# Patient Record
Sex: FEMALE | Race: BLACK OR AFRICAN AMERICAN | NOT HISPANIC OR LATINO | Employment: FULL TIME | URBAN - METROPOLITAN AREA
[De-identification: names, ages, dates, MRNs, and addresses within clinical notes are randomized per-mention and may not be internally consistent; named-entity substitution may affect disease eponyms.]

---
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
## 2022-04-09 ENCOUNTER — HISTORICAL (OUTPATIENT)
Dept: ADMINISTRATIVE | Facility: HOSPITAL | Age: 22
End: 2022-04-09

## 2022-04-27 VITALS
BODY MASS INDEX: 20.82 KG/M2 | DIASTOLIC BLOOD PRESSURE: 77 MMHG | OXYGEN SATURATION: 100 % | WEIGHT: 106.06 LBS | SYSTOLIC BLOOD PRESSURE: 120 MMHG | HEIGHT: 60 IN

## 2022-08-30 ENCOUNTER — HOSPITAL ENCOUNTER (EMERGENCY)
Facility: HOSPITAL | Age: 22
Discharge: HOME | End: 2022-08-30
Attending: FAMILY MEDICINE
Payer: MEDICAID

## 2022-08-30 VITALS
RESPIRATION RATE: 16 BRPM | SYSTOLIC BLOOD PRESSURE: 124 MMHG | DIASTOLIC BLOOD PRESSURE: 69 MMHG | BODY MASS INDEX: 20.35 KG/M2 | WEIGHT: 103.63 LBS | OXYGEN SATURATION: 97 % | TEMPERATURE: 98 F | HEIGHT: 60 IN | HEART RATE: 77 BPM

## 2022-08-30 DIAGNOSIS — N93.8 DYSFUNCTIONAL UTERINE BLEEDING: Primary | ICD-10-CM

## 2022-08-30 LAB
ALBUMIN SERPL-MCNC: 4 GM/DL (ref 3.5–5)
ALBUMIN/GLOB SERPL: 1 RATIO (ref 1.1–2)
ALP SERPL-CCNC: 50 UNIT/L (ref 40–150)
ALT SERPL-CCNC: 10 UNIT/L (ref 0–55)
APPEARANCE UR: ABNORMAL
AST SERPL-CCNC: 19 UNIT/L (ref 5–34)
B-HCG UR QL: NEGATIVE
BACTERIA #/AREA URNS AUTO: ABNORMAL /HPF
BASOPHILS # BLD AUTO: 0.02 X10(3)/MCL (ref 0–0.2)
BASOPHILS NFR BLD AUTO: 0.4 %
BILIRUB UR QL STRIP.AUTO: NEGATIVE MG/DL
BILIRUBIN DIRECT+TOT PNL SERPL-MCNC: 0.5 MG/DL
BUN SERPL-MCNC: 11.8 MG/DL (ref 7–18.7)
CALCIUM SERPL-MCNC: 9.9 MG/DL (ref 8.4–10.2)
CHLORIDE SERPL-SCNC: 103 MMOL/L (ref 98–107)
CO2 SERPL-SCNC: 26 MMOL/L (ref 22–29)
COLOR UR AUTO: ABNORMAL
CREAT SERPL-MCNC: 0.78 MG/DL (ref 0.55–1.02)
CTP QC/QA: YES
EOSINOPHIL # BLD AUTO: 0.07 X10(3)/MCL (ref 0–0.9)
EOSINOPHIL NFR BLD AUTO: 1.4 %
ERYTHROCYTE [DISTWIDTH] IN BLOOD BY AUTOMATED COUNT: 13 % (ref 11.5–17)
GFR SERPLBLD CREATININE-BSD FMLA CKD-EPI: >60 MLS/MIN/1.73/M2
GLOBULIN SER-MCNC: 4 GM/DL (ref 2.4–3.5)
GLUCOSE SERPL-MCNC: 99 MG/DL (ref 74–100)
GLUCOSE UR QL STRIP.AUTO: NEGATIVE MG/DL
HCT VFR BLD AUTO: 33.7 % (ref 37–47)
HGB BLD-MCNC: 11.4 GM/DL (ref 12–16)
IMM GRANULOCYTES # BLD AUTO: 0.02 X10(3)/MCL (ref 0–0.04)
IMM GRANULOCYTES NFR BLD AUTO: 0.4 %
KETONES UR QL STRIP.AUTO: ABNORMAL MG/DL
LEUKOCYTE ESTERASE UR QL STRIP.AUTO: 75 UNIT/L
LYMPHOCYTES # BLD AUTO: 2.09 X10(3)/MCL (ref 0.6–4.6)
LYMPHOCYTES NFR BLD AUTO: 42.7 %
MCH RBC QN AUTO: 29.9 PG (ref 27–31)
MCHC RBC AUTO-ENTMCNC: 33.8 MG/DL (ref 33–36)
MCV RBC AUTO: 88.5 FL (ref 80–94)
MONOCYTES # BLD AUTO: 0.36 X10(3)/MCL (ref 0.1–1.3)
MONOCYTES NFR BLD AUTO: 7.3 %
NEUTROPHILS # BLD AUTO: 2.3 X10(3)/MCL (ref 2.1–9.2)
NEUTROPHILS NFR BLD AUTO: 47.8 %
NITRITE UR QL STRIP.AUTO: NEGATIVE
NRBC BLD AUTO-RTO: 0 %
PH UR STRIP.AUTO: 5.5 [PH]
PLATELET # BLD AUTO: 318 X10(3)/MCL (ref 130–400)
PMV BLD AUTO: 10.3 FL (ref 7.4–10.4)
POTASSIUM SERPL-SCNC: 3.7 MMOL/L (ref 3.5–5.1)
PROT SERPL-MCNC: 8 GM/DL (ref 6.4–8.3)
PROT UR QL STRIP.AUTO: ABNORMAL MG/DL
RBC # BLD AUTO: 3.81 X10(6)/MCL (ref 4.2–5.4)
RBC #/AREA URNS AUTO: >=100 /HPF
RBC UR QL AUTO: ABNORMAL UNIT/L
SODIUM SERPL-SCNC: 138 MMOL/L (ref 136–145)
SP GR UR STRIP.AUTO: 1.03
SQUAMOUS #/AREA URNS LPF: ABNORMAL /HPF
UROBILINOGEN UR STRIP-ACNC: NORMAL MG/DL
WBC # SPEC AUTO: 4.9 X10(3)/MCL (ref 4.5–11.5)
WBC #/AREA URNS AUTO: ABNORMAL /HPF

## 2022-08-30 PROCEDURE — 81025 URINE PREGNANCY TEST: CPT | Performed by: PHYSICIAN ASSISTANT

## 2022-08-30 PROCEDURE — 99284 EMERGENCY DEPT VISIT MOD MDM: CPT | Mod: 25

## 2022-08-30 PROCEDURE — 85025 COMPLETE CBC W/AUTO DIFF WBC: CPT | Performed by: PHYSICIAN ASSISTANT

## 2022-08-30 PROCEDURE — 80053 COMPREHEN METABOLIC PANEL: CPT | Performed by: PHYSICIAN ASSISTANT

## 2022-08-30 PROCEDURE — 36415 COLL VENOUS BLD VENIPUNCTURE: CPT | Performed by: PHYSICIAN ASSISTANT

## 2022-08-30 PROCEDURE — 81001 URINALYSIS AUTO W/SCOPE: CPT | Performed by: PHYSICIAN ASSISTANT

## 2022-08-30 RX ORDER — FERROUS SULFATE 325(65) MG
325 TABLET, DELAYED RELEASE (ENTERIC COATED) ORAL 2 TIMES DAILY
Qty: 60 TABLET | Refills: 0 | Status: SHIPPED | OUTPATIENT
Start: 2022-08-30 | End: 2022-09-29

## 2022-08-30 RX ORDER — ASCORBIC ACID 250 MG
250 TABLET ORAL 2 TIMES DAILY
Qty: 60 TABLET | Refills: 0 | Status: SHIPPED | OUTPATIENT
Start: 2022-08-30 | End: 2022-09-29

## 2022-08-30 NOTE — Clinical Note
"Dinorah Patel"Surendra was seen and treated in our emergency department on 8/30/2022.  She may return to work on 08/31/2022.       If you have any questions or concerns, please don't hesitate to call.      AARON Brantley"

## 2022-08-30 NOTE — DISCHARGE INSTRUCTIONS
Take Iron and Vitamin C daily (at the same time) to help prevent anemia.    Follow up with your GYN within 1 week.   Return to ED with worsening symptoms.    Today your H/H: 11.4/33.7

## 2022-08-30 NOTE — ED PROVIDER NOTES
Encounter Date: 8/30/2022       History     Chief Complaint   Patient presents with    Vaginal Bleeding     PT W CO VAG BLEEDING X 2 WKS. LMP 8/10/22 TILL NOW. LAST DEPO SHOT DEC 2021.      Patient is a 22 year old female who presents to the ED with heavy vaginal bleeding x 10 days.   She states her last Depo shot was in December 2021.  LMP: 8/10/22 and she has been bleeding since with fluctuating heavy and light days.    She denies pain, nausea, vomiting, dizziness, SOB, CP, vision changes, weakness.  She states she called her OBGYN and they told her to go to the ED for evaluation.      The history is provided by the patient. No  was used.   Vaginal Bleeding  This is a new problem. Episode onset: 10 days. The problem occurs daily. Progression since onset: fluctuating. Pertinent negatives include no chest pain, no abdominal pain, no headaches and no shortness of breath. Nothing aggravates the symptoms. Nothing relieves the symptoms.   Review of patient's allergies indicates:  No Known Allergies  Past Medical History:   Diagnosis Date    Human immunodeficiency virus (HIV) disease      History reviewed. No pertinent surgical history.  History reviewed. No pertinent family history.  Social History     Tobacco Use    Smoking status: Never    Smokeless tobacco: Never     Review of Systems   Constitutional:  Negative for chills and fever.   Eyes: Negative.    Respiratory: Negative.  Negative for shortness of breath.    Cardiovascular: Negative.  Negative for chest pain.   Gastrointestinal:  Negative for abdominal pain, diarrhea, nausea and vomiting.   Genitourinary:  Positive for vaginal bleeding. Negative for flank pain and vaginal discharge.   Skin: Negative.    Neurological:  Negative for dizziness, light-headedness, numbness and headaches.   Hematological: Negative.      Physical Exam     Initial Vitals [08/30/22 1526]   BP Pulse Resp Temp SpO2   123/74 83 16 97.9 °F (36.6 °C) 100 %      MAP        --         Physical Exam    Nursing note and vitals reviewed.  Constitutional: She appears well-developed and well-nourished.   HENT:   Nose: Nose normal.   Mouth/Throat: Oropharynx is clear and moist.   Eyes: Conjunctivae are normal.   Neck:   Normal range of motion.  Cardiovascular:  Normal rate, normal heart sounds and intact distal pulses.           Pulmonary/Chest: Breath sounds normal. She has no wheezes.   Abdominal: Abdomen is soft. Bowel sounds are normal. There is no abdominal tenderness.   Musculoskeletal:         General: Normal range of motion.      Cervical back: Normal range of motion.     Neurological: She is alert and oriented to person, place, and time. She has normal strength.   Skin: Skin is warm. Capillary refill takes less than 2 seconds. No rash noted. No pallor.       ED Course   Procedures  Labs Reviewed   COMPREHENSIVE METABOLIC PANEL - Abnormal; Notable for the following components:       Result Value    Globulin 4.0 (*)     Albumin/Globulin Ratio 1.0 (*)     All other components within normal limits   URINALYSIS, REFLEX TO URINE CULTURE - Abnormal; Notable for the following components:    Color, UA Red (*)     Appearance, UA Cloudy (*)     Protein, UA 1+ (*)     Ketones, UA 1+ (*)     Blood, UA 3+ (*)     Bacteria, UA Trace (*)     RBC, UA >=100 (*)     All other components within normal limits   CBC WITH DIFFERENTIAL - Abnormal; Notable for the following components:    RBC 3.81 (*)     Hgb 11.4 (*)     Hct 33.7 (*)     All other components within normal limits   CBC W/ AUTO DIFFERENTIAL    Narrative:     The following orders were created for panel order CBC Auto Differential.  Procedure                               Abnormality         Status                     ---------                               -----------         ------                     CBC with Differential[[id number]]        Abnormal            Final result                 Please view results for these tests on the  individual orders.   EXTRA TUBES    Narrative:     The following orders were created for panel order EXTRA TUBES.  Procedure                               Abnormality         Status                     ---------                               -----------         ------                     Light Blue Top Hold[[id number]]                              In process                 Gold Top Hold[[id number]]                                    In process                 Pink Top Hold[[id number]]                                    In process                   Please view results for these tests on the individual orders.   LIGHT BLUE TOP HOLD   GOLD TOP HOLD   PINK TOP HOLD   POCT URINE PREGNANCY          Imaging Results    None          Medications - No data to display  Medical Decision Making:   Clinical Tests:   Lab Tests: Ordered and Reviewed  ED Management:  The patient is resting comfortably and in no acute distress.  I personally discussed her test results and treatment plan.  She declined oral provera at this time because she states she does not want to any hormones at this time.   Iron and Vitamin C prescribed.  Gave strict ED precautions.  Specific conditions for return to the emergency department and importance of follow up with her primary care provider and GYN.  Patient voices understanding and agrees to the plan discussed. All patients' questions have been answered at this time.   She has remained hemodynamically stable throughout entire stay in ED and is stable for discharge home.              ED Course as of 08/30/22 1751   Tue Aug 30, 2022   1556 Preg Test, Ur: Negative [ER]   1717 Hemoglobin(!): 11.4 [ER]   1718 Hematocrit(!): 33.7 [ER]      ED Course User Index  [ER] AARON Brantley             Clinical Impression:   Final diagnoses:  [N93.8] Dysfunctional uterine bleeding (Primary)      ED Disposition Condition    Discharge Stable          ED Prescriptions       Medication Sig Dispense Start Date End Date  Auth. Provider    ascorbic acid, vitamin C, (VITAMIN C) 250 MG tablet Take 1 tablet (250 mg total) by mouth 2 (two) times daily. 60 tablet 8/30/2022 9/29/2022 AARON Brantley    ferrous sulfate 325 (65 FE) MG EC tablet Take 1 tablet (325 mg total) by mouth 2 (two) times daily. Take Iron tab at the same time as the Vitamin C tab. 60 tablet 8/30/2022 9/29/2022 AARON Brantley          Follow-up Information       Follow up With Specialties Details Why Contact Info    [facility] - Emergency Dept Emergency Medicine  As needed, If symptoms worsen [address] [id number] [phone]             AARON Brantley  08/30/22 2564

## 2022-10-06 DIAGNOSIS — Z00.01 ABNORMAL WELLNESS EXAM: ICD-10-CM

## 2022-10-06 DIAGNOSIS — B20 HIV INFECTION, UNSPECIFIED SYMPTOM STATUS: Primary | ICD-10-CM

## 2022-11-14 ENCOUNTER — TELEPHONE (OUTPATIENT)
Dept: PEDIATRICS | Facility: CLINIC | Age: 22
End: 2022-11-14
Payer: MEDICAID

## 2022-11-14 ENCOUNTER — OFFICE VISIT (OUTPATIENT)
Dept: GYNECOLOGY | Facility: CLINIC | Age: 22
End: 2022-11-14
Payer: MEDICAID

## 2022-11-14 VITALS
TEMPERATURE: 99 F | SYSTOLIC BLOOD PRESSURE: 101 MMHG | DIASTOLIC BLOOD PRESSURE: 68 MMHG | HEIGHT: 60 IN | WEIGHT: 107 LBS | OXYGEN SATURATION: 100 % | HEART RATE: 98 BPM | RESPIRATION RATE: 20 BRPM | BODY MASS INDEX: 21.01 KG/M2

## 2022-11-14 DIAGNOSIS — B20 HIV INFECTION, UNSPECIFIED SYMPTOM STATUS: ICD-10-CM

## 2022-11-14 DIAGNOSIS — N93.8 DYSFUNCTIONAL UTERINE BLEEDING: ICD-10-CM

## 2022-11-14 DIAGNOSIS — Z12.4 SCREENING FOR CERVICAL CANCER: Primary | ICD-10-CM

## 2022-11-14 PROBLEM — Z21 HIV INFECTION: Status: ACTIVE | Noted: 2022-11-14

## 2022-11-14 LAB
B-HCG UR QL: NEGATIVE
CTP QC/QA: YES

## 2022-11-14 PROCEDURE — 87661 TRICHOMONAS VAGINALIS AMPLIF: CPT

## 2022-11-14 PROCEDURE — 87591 N.GONORRHOEAE DNA AMP PROB: CPT

## 2022-11-14 PROCEDURE — 87480 CANDIDA DNA DIR PROBE: CPT

## 2022-11-14 PROCEDURE — 87491 CHLMYD TRACH DNA AMP PROBE: CPT

## 2022-11-14 PROCEDURE — 81025 URINE PREGNANCY TEST: CPT | Mod: PBBFAC

## 2022-11-14 PROCEDURE — 99213 OFFICE O/P EST LOW 20 MIN: CPT | Mod: PBBFAC

## 2022-11-14 PROCEDURE — 87624 HPV HI-RISK TYP POOLED RSLT: CPT

## 2022-11-14 PROCEDURE — 58300 INSERT INTRAUTERINE DEVICE: CPT | Mod: PBBFAC | Performed by: STUDENT IN AN ORGANIZED HEALTH CARE EDUCATION/TRAINING PROGRAM

## 2022-11-14 RX ORDER — SUMATRIPTAN SUCCINATE 25 MG/1
25 TABLET ORAL
COMMUNITY
Start: 2021-07-14

## 2022-11-14 RX ORDER — CETIRIZINE HYDROCHLORIDE 10 MG/1
10 TABLET ORAL DAILY PRN
COMMUNITY
Start: 2021-07-14

## 2022-11-14 RX ORDER — ACETAMINOPHEN 500 MG
400 TABLET ORAL DAILY
COMMUNITY
Start: 2021-07-14 | End: 2024-03-26

## 2022-11-14 RX ADMIN — LEVONORGESTREL 1 INTRA UTERINE DEVICE: 52 INTRAUTERINE DEVICE INTRAUTERINE at 10:11

## 2022-11-14 NOTE — PROGRESS NOTES
[facility] CLINIC NOTE  [facility]  [address]  [place] [address]  Phone: [phone]  Fax: [phone]    Subjective:     Dinorah Agosto is a 22 y.o. G0 with h/o HIV who presents complaining of 3 month h/o menorrhagia. Pt has a h/o regular cycles with menorrhagia. She was started on Depo Provera in 2020 2/2 contraception and h/o menorrhagia. Pt was subsequently amenorrheic but discontinued the regimen in 3/2022 due to mood issues. Pt then had a normal cycle in 6/2022 followed by a cycle in 8/2022 which lasted for a month. She was evaluated in the ED for this issue, H/H was 11.4/33.7 and was prescribed PO Iron, which she isn't taking. Pt didn't have a cycle in 9/2022 and then had a normal cycle in 10/2022. Requires 8 pads/day on heaviest days. Denies dysmenorrhea. Denies chest pain, shortness of breath, fatigue, dizziness, or any syncopal episodes.    Reports her mood symptoms improved after discontinuing Depo Provera. Denies SI/HI today. Of note, pt acquired HIV at time of birth. Last CD4 count 753 (12/2021). Currently compliant on Biktarvy.    Allergies: NKDA  OBHx:G0  GynHx:   11-12/IR q1-2 months; previously regular; + menorrhagia; denies dysmenorrhea  LMP: 10/30/2022  H/o HIV, acquired at birth; Denies recent h/o STIs  No Pap smears noted; pt is sexually active  Contraception: None    MedHx:   Past Medical History:   Diagnosis Date    Human immunodeficiency virus (HIV) disease        SurgHx:   Past Surgical History:   Procedure Laterality Date    HERNIA REPAIR       Medications:     Current Outpatient Medications:     cetirizine (ZYRTEC) 10 MG tablet, Take 10 mg by mouth daily as needed., Disp: , Rfl:     cholecalciferol, vitamin D3, (D3-2000) 50 mcg (2,000 unit) Cap capsule, Take 400 Int'l Units by mouth once daily., Disp: , Rfl:     sumatriptan (IMITREX) 25 MG Tab, Take 25 mg by mouth 2 (two) times daily., Disp: , Rfl:     BIKTARVY -25 mg (25 kg or greater), TAKE ONE TABLET BY MOUTH EVERY DAY,  Disp: 30 tablet, Rfl: 5    Current Facility-Administered Medications:     levonorgestreL (MIRENA) 20 mcg/24 hours (8 yrs) 52 mg IUD 1 Intra Uterine Device, 1 each, Intrauterine, 1 time in Clinic/HOD, Leif Jackson MD     Hx: Denies hx of ovarian, uterine, endometrial, or colon cancer. Denies history of bleeding or coagulation disorders.  Family History   Problem Relation Age of Onset    HIV Mother        Social Hx: Social alcohol. Denies tobacco and illicit drug usage.  Social History     Socioeconomic History    Marital status: Single   Tobacco Use    Smoking status: Never    Smokeless tobacco: Never   Substance and Sexual Activity    Alcohol use: Yes    Drug use: Never    Sexual activity: Yes       Review of Systems  Denies fevers, chills, headache, blurry vision, nausea, vomiting, dizziness, or syncope.   Denies chest pain, shortness of breath, RUQ pain, or calf pain.    Objective:     Vitals:    11/14/22 0914   BP: 101/68   Pulse: 98   Resp: 20   Temp: 98.8 °F (37.1 °C)   SpO2: 100%   Weight: 48.5 kg (107 lb)   Height: 5' (1.524 m)     Body mass index is 20.9 kg/m².    Physical Exam:     General: alert and oriented, in no acute distress  Lungs: clear to auscultation bilaterally, no conversational dyspnea  Heart: regular rate and rhythm  Abdomen: Soft, non-distended, non tender to palpation, no involuntary guarding, no rebound tenderness  Extremities: Normal, atraumatic, non-edematous, No cords or calf tenderness, No significant calf/ankle edema  External genitalia: Normal female genitalia without lesion, discharge or tenderness. Normal appearing urethral meatus. Normal appearing external anus.  Bimanual Exam: No pelvic lymphadenopathy noted bilaterally. Vagina with adequate capacity. Uterus 8-10cm in size, no cervical motion tenderness. Smooth in contour, no masses. Good descent, mobile. No adnexal fullness/tenderness. Normal urethra. Normal bladder  Speculum Exam: Vaginal mucosa normal in appearance. Pink.  No masses/lesions. Cervix well visualized, smooth in contour no masses or lesions. Os normal in appearance, no blood or discharge coming from the os    Note: RN chaperone present for entirety of exam.    Procedure Note:  Reviewed the risks, benefits, and alternatives of the contraceptive agents listed below. All questions were answered, and consents were signed prior to insertion of IUD, which is what the patient desired.    Reviewed the risks, benefits, and alternatives of the following contraceptive agents. Discussed:  Barrier protection (need to use with every use, protection against STDs);   Hormonal methods of contraception including pills, patch and ring with use daily, weekly and monthly respectively (protection against ovarian and uterine cancer, decreased rates of anemia, risks of HTN, DVT, and concerns in patients with migraines with aura);   Progesterone only methods including pills (strict compliance advised) and depot injection (risk of irregular bleeding, 5# weight gain in first year);   LARC including implants (3 years of protection against conception, placed in bicep groove of arm as an outpatient procedure), and IUDs (Mirena and Paraguard with their use for 5 and 10 years respectively.  Mirena IUD has localized progesterone and thus many patients have decrease in their cycles, some with amenorrhea.  Paraguard has no hormones).    Procedure Details   Urine pregnancy test was negative today.  Last pap smear: Collected today    Pt placed in dorsal lithotomy position, sterile speculum inserted. Cervix cleansed with Betadine. Anterior lip of the cervix grasped with the single toothed tenaculum.  Uterus sounded to 10  cm.  IUD inserted and deployed without difficulty.  The IUD string was visible and trimmed and all instruments were removed from the vagina.  Tenaculum sites were hemostatic. Patient tolerated the procedure well. Minimal EBL noted.        IUD Information: [id number]  Assessment/Plan:    Dinorah  ABRAN Agosto is a 22 y.o. G0 with menorrhagia, s/p successful placement of Mirena IUD.    Menorrhagia   Mirena IUD placed today, appropriate for 5 years for AUB sx, 8 years for contraception sx   The patient was advised to call for any fever or for prolonged or severe pain or bleeding. She was advised to use OTC NSAIDs as needed for mild to moderate pain.  HCM  H/o HIV, Pap w/ HR HPV testing today given immunocompromised stated  S/p Gardasil vaccine regimen in 2014   Plan for annual Paps x3 years, if all normal can space out to q3 years    RTC in 1 year for WWE    Patient and plan were discussed with Dr. Bennett.    Leif Jackson MD, MPH  [facility] -  PGY4  Pager: [phone]  9:52 AM 11/14/2022

## 2022-11-14 NOTE — TELEPHONE ENCOUNTER
----- Message from Annika Beard sent at 11/14/2022 10:59 AM CST -----  Regarding: Patient Care  Julieth/Dinorah Yanes-[phone]     Patient called to update you on establishing a visit with new PCP Rama Rocha and she also went to GYN appointment and now has Mirena.    Thank you

## 2022-11-17 DIAGNOSIS — Z13.29 SCREENING FOR THYROID DISORDER: ICD-10-CM

## 2022-11-17 DIAGNOSIS — Z13.1 SCREENING FOR DIABETES MELLITUS (DM): ICD-10-CM

## 2022-11-17 DIAGNOSIS — Z13.220 SCREENING FOR LIPID DISORDERS: ICD-10-CM

## 2022-11-17 DIAGNOSIS — Z11.3 SCREEN FOR STD (SEXUALLY TRANSMITTED DISEASE): ICD-10-CM

## 2022-11-17 DIAGNOSIS — Z00.00 ENCOUNTER FOR WELLNESS EXAMINATION IN ADULT: Primary | ICD-10-CM

## 2022-11-17 DIAGNOSIS — Z13.21 ENCOUNTER FOR VITAMIN DEFICIENCY SCREENING: ICD-10-CM

## 2022-11-20 LAB
INSULIN SERPL-ACNC: NORMAL U[IU]/ML
LAB AP BETHESDA CATEGORY: NORMAL
LAB AP CLINICAL FINDINGS: NORMAL
LAB AP CONTRACEPTIVES: NORMAL
LAB AP GYN MOLECULAR TESTING: NORMAL
LAB AP LMP DATE: NORMAL
LAB AP OCHS PAP SPECIMEN ADEQUACY: NORMAL
LAB AP OHS PAP INTERPRETATION: NORMAL
LAB AP PAP DISCLAIMER COMMENTS: NORMAL
LAB AP PAP PREVIOUS BX: NORMAL
LAB AP PAP PRIOR TREATMENT: NORMAL

## 2022-11-21 ENCOUNTER — OFFICE VISIT (OUTPATIENT)
Dept: INTERNAL MEDICINE | Facility: CLINIC | Age: 22
End: 2022-11-21
Payer: MEDICAID

## 2022-11-21 VITALS
HEIGHT: 60 IN | TEMPERATURE: 98 F | BODY MASS INDEX: 20.38 KG/M2 | WEIGHT: 103.81 LBS | HEART RATE: 80 BPM | DIASTOLIC BLOOD PRESSURE: 74 MMHG | RESPIRATION RATE: 18 BRPM | SYSTOLIC BLOOD PRESSURE: 112 MMHG

## 2022-11-21 DIAGNOSIS — G43.909 MIGRAINE WITHOUT STATUS MIGRAINOSUS, NOT INTRACTABLE, UNSPECIFIED MIGRAINE TYPE: ICD-10-CM

## 2022-11-21 DIAGNOSIS — Z23 NEED FOR VACCINATION: ICD-10-CM

## 2022-11-21 DIAGNOSIS — B20: Primary | ICD-10-CM

## 2022-11-21 PROBLEM — Z12.4 SCREENING FOR CERVICAL CANCER: Status: RESOLVED | Noted: 2022-11-14 | Resolved: 2022-11-21

## 2022-11-21 PROBLEM — N93.8 DYSFUNCTIONAL UTERINE BLEEDING: Status: RESOLVED | Noted: 2022-11-14 | Resolved: 2022-11-21

## 2022-11-21 PROCEDURE — 3078F PR MOST RECENT DIASTOLIC BLOOD PRESSURE < 80 MM HG: ICD-10-PCS | Mod: CPTII,,,

## 2022-11-21 PROCEDURE — 90471 IMMUNIZATION ADMIN: CPT | Mod: PBBFAC

## 2022-11-21 PROCEDURE — 99203 OFFICE O/P NEW LOW 30 MIN: CPT | Mod: 25,S$PBB,,

## 2022-11-21 PROCEDURE — 99385 PREV VISIT NEW AGE 18-39: CPT | Mod: S$PBB,1E,GY,

## 2022-11-21 PROCEDURE — 1160F PR REVIEW ALL MEDS BY PRESCRIBER/CLIN PHARMACIST DOCUMENTED: ICD-10-PCS | Mod: CPTII,,,

## 2022-11-21 PROCEDURE — 3074F SYST BP LT 130 MM HG: CPT | Mod: CPTII,,,

## 2022-11-21 PROCEDURE — 3078F DIAST BP <80 MM HG: CPT | Mod: CPTII,,,

## 2022-11-21 PROCEDURE — 1160F RVW MEDS BY RX/DR IN RCRD: CPT | Mod: CPTII,,,

## 2022-11-21 PROCEDURE — 99385 PR PREVENTIVE VISIT,NEW,18-39: ICD-10-PCS | Mod: S$PBB,1E,GY,

## 2022-11-21 PROCEDURE — 1159F MED LIST DOCD IN RCRD: CPT | Mod: CPTII,,,

## 2022-11-21 PROCEDURE — 3008F BODY MASS INDEX DOCD: CPT | Mod: CPTII,,,

## 2022-11-21 PROCEDURE — 99203 PR OFFICE/OUTPT VISIT, NEW, LEVL III, 30-44 MIN: ICD-10-PCS | Mod: 25,S$PBB,,

## 2022-11-21 PROCEDURE — 3074F PR MOST RECENT SYSTOLIC BLOOD PRESSURE < 130 MM HG: ICD-10-PCS | Mod: CPTII,,,

## 2022-11-21 PROCEDURE — 1159F PR MEDICATION LIST DOCUMENTED IN MEDICAL RECORD: ICD-10-PCS | Mod: CPTII,,,

## 2022-11-21 PROCEDURE — 3008F PR BODY MASS INDEX (BMI) DOCUMENTED: ICD-10-PCS | Mod: CPTII,,,

## 2022-11-21 PROCEDURE — 99214 OFFICE O/P EST MOD 30 MIN: CPT | Mod: PBBFAC

## 2022-11-21 NOTE — ASSESSMENT & PLAN NOTE
After obtaining informed consent, the flu vaccine was administered.  Ok to take acetaminophen for soreness of arm.

## 2022-11-21 NOTE — ASSESSMENT & PLAN NOTE
Continue sumatriptan as prescribed.  Avoid triggers such as bright lights, alcohol, nicotine, aged cheeses, chocolate, caffeine, foods/drinks that contain nitrates or aspartame.   Take meds as prescribed.   Lie in a quiet, dark room if possible.   Limit stress and get at least 8 hours of sleep per night.

## 2022-11-21 NOTE — PROGRESS NOTES
Subjective:       Patient ID: Dinorah Agosto is a 22 y.o. female.    Chief Complaint: Follow-up and Initial Visit    HPI  Dinorah Agosto is a very pleasant 22 y.o. Black  female presenting in clinic today to Follow-up and Initial Visit. Previous PCP: Dr. Surendra Mcrae. PMH: HIV (acquired at birth), ADHD, herpes simplex, vitamin D deficiency, menorrhagia (Mirena insertion 2022), migraines (controlled with imitrex), seasonal allergies. She is followed by [facility] GYN clinic. She voices compliance with Biktarvy. She was adopted as a , it was an open adoption. Her biological father passed away of HIV. Her biological mother is still living.     She denies any complaints today.     Denies smoking or illicit drug use. She drinks wine and/or liquor every other weekend. Denies chest pain, shortness of breath, cough, headache, dizziness, weakness, abdominal pain, nausea, vomiting, diarrhea, constipation, dysuria, depression, anxiety, SI, and HI.     Cervical Cancer Screening - Last Pap on 2022. Follow up with GYN Clinic for annual Pap/Pelvic.  Breast Cancer Screening - Deferred due to age.   Colon Cancer Screening - Deferred due to age.   Osteoporosis Screening - Deferred due to age.   Eye Exam - [facility]-2022.  Dental Exam - Several years. List of local dentists given to patient.  Vaccinations: Flu - 2022 / Tetanus - 2013 / Covid - 2021, 2021, 3/16/2022     Review of Systems   Constitutional: Negative.    HENT: Negative.     Eyes: Negative.    Respiratory: Negative.     Cardiovascular: Negative.    Gastrointestinal: Negative.    Endocrine: Negative.    Genitourinary: Negative.    Musculoskeletal: Negative.    Integumentary:  Negative.   Allergic/Immunologic: Negative.    Neurological: Negative.    Hematological: Negative.    Psychiatric/Behavioral: Negative.     All other systems reviewed and are negative.      Objective:      Physical Exam  Vitals reviewed.    Constitutional:       Appearance: Normal appearance. She is normal weight.   HENT:      Head: Normocephalic and atraumatic.      Right Ear: External ear normal.      Left Ear: External ear normal.      Nose: Nose normal.      Mouth/Throat:      Mouth: Mucous membranes are moist.      Pharynx: Oropharynx is clear.   Eyes:      Extraocular Movements: Extraocular movements intact.      Conjunctiva/sclera: Conjunctivae normal.      Pupils: Pupils are equal, round, and reactive to light.   Cardiovascular:      Rate and Rhythm: Normal rate and regular rhythm.      Pulses: Normal pulses.      Heart sounds: Normal heart sounds.   Pulmonary:      Effort: Pulmonary effort is normal.      Breath sounds: Normal breath sounds.   Abdominal:      General: Bowel sounds are normal.      Palpations: Abdomen is soft.   Musculoskeletal:         General: Normal range of motion.      Cervical back: Normal range of motion and neck supple.   Skin:     General: Skin is warm and dry.      Capillary Refill: Capillary refill takes less than 2 seconds.   Neurological:      General: No focal deficit present.      Mental Status: She is alert and oriented to person, place, and time.   Psychiatric:         Mood and Affect: Mood normal.         Behavior: Behavior normal.         Thought Content: Thought content normal.         Judgment: Judgment normal.       Assessment and Plan:       Problem List Items Addressed This Visit          Neuro    Migraine without status migrainosus, not intractable     Continue sumatriptan as prescribed.  Avoid triggers such as bright lights, alcohol, nicotine, aged cheeses, chocolate, caffeine, foods/drinks that contain nitrates or aspartame.   Take meds as prescribed.   Lie in a quiet, dark room if possible.   Limit stress and get at least 8 hours of sleep per night.               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  HIV viral load ordered.         Relevant Orders    HIV RNA, Quantitative, PCR    Need for vaccination     After obtaining informed consent, the flu vaccine was administered.  Ok to take acetaminophen for soreness of arm.          Relevant Orders    Influenza - Quadrivalent (PF) (Completed)       Endocrine    BMI 20.0-20.9, adult     Body mass index is 20.27 kg/m². (At goal).              Virtual visit in 4 weeks.  RTC prn.  Labs within a week of visit.    OLIVIA Sosa  11/21/22

## 2022-11-29 DIAGNOSIS — B20: Primary | ICD-10-CM

## 2022-12-13 LAB
CANDIDA SPECIES (PRECISION): NEGATIVE
CHLAMYDIA TRACHOMATIS (PRECISION): NEGATIVE
GARDNERELLA VAGINALIS (PRECISION): POSITIVE
HPV16+18+H RISK 12 DNA CVX-IMP: NEGATIVE
NEISSERIA GONORRHOEAE (PRECISION): NEGATIVE
TRICHOMONAS VAGINALIS (PRECISION): NEGATIVE

## 2022-12-20 DIAGNOSIS — Z13.1 SCREENING FOR DIABETES MELLITUS (DM): ICD-10-CM

## 2022-12-20 DIAGNOSIS — B20 HIV INFECTION, UNSPECIFIED SYMPTOM STATUS: Primary | ICD-10-CM

## 2022-12-20 DIAGNOSIS — Z13.29 SCREENING FOR THYROID DISORDER: ICD-10-CM

## 2022-12-20 DIAGNOSIS — Z13.220 SCREENING FOR LIPID DISORDERS: ICD-10-CM

## 2022-12-20 DIAGNOSIS — Z11.3 SCREEN FOR STD (SEXUALLY TRANSMITTED DISEASE): ICD-10-CM

## 2022-12-20 DIAGNOSIS — E55.9 VITAMIN D DEFICIENCY: ICD-10-CM

## 2022-12-20 DIAGNOSIS — Z00.00 ENCOUNTER FOR WELLNESS EXAMINATION IN ADULT: Primary | ICD-10-CM

## 2022-12-20 DIAGNOSIS — Z13.21 ENCOUNTER FOR VITAMIN DEFICIENCY SCREENING: ICD-10-CM

## 2022-12-30 ENCOUNTER — LAB VISIT (OUTPATIENT)
Dept: LAB | Facility: HOSPITAL | Age: 22
End: 2022-12-30
Payer: MEDICAID

## 2022-12-30 DIAGNOSIS — Z11.3 SCREEN FOR STD (SEXUALLY TRANSMITTED DISEASE): ICD-10-CM

## 2022-12-30 DIAGNOSIS — Z13.29 SCREENING FOR THYROID DISORDER: ICD-10-CM

## 2022-12-30 DIAGNOSIS — Z00.00 ENCOUNTER FOR WELLNESS EXAMINATION IN ADULT: ICD-10-CM

## 2022-12-30 DIAGNOSIS — E55.9 VITAMIN D DEFICIENCY: ICD-10-CM

## 2022-12-30 DIAGNOSIS — Z13.220 SCREENING FOR LIPID DISORDERS: ICD-10-CM

## 2022-12-30 DIAGNOSIS — Z13.1 SCREENING FOR DIABETES MELLITUS (DM): ICD-10-CM

## 2022-12-30 DIAGNOSIS — Z13.21 ENCOUNTER FOR VITAMIN DEFICIENCY SCREENING: ICD-10-CM

## 2022-12-30 LAB
ALBUMIN SERPL-MCNC: 4 G/DL (ref 3.5–5)
ALBUMIN/GLOB SERPL: 1 RATIO (ref 1.1–2)
ALP SERPL-CCNC: 52 UNIT/L (ref 40–150)
ALT SERPL-CCNC: 13 UNIT/L (ref 0–55)
APPEARANCE UR: CLEAR
AST SERPL-CCNC: 18 UNIT/L (ref 5–34)
BACTERIA #/AREA URNS AUTO: ABNORMAL /HPF
BASOPHILS # BLD AUTO: 0.04 X10(3)/MCL (ref 0–0.2)
BASOPHILS NFR BLD AUTO: 0.9 %
BILIRUB UR QL STRIP.AUTO: NEGATIVE MG/DL
BILIRUBIN DIRECT+TOT PNL SERPL-MCNC: 0.3 MG/DL
BUN SERPL-MCNC: 17 MG/DL (ref 7–18.7)
C TRACH DNA SPEC QL NAA+PROBE: NOT DETECTED
CALCIUM SERPL-MCNC: 9.9 MG/DL (ref 8.4–10.2)
CHLORIDE SERPL-SCNC: 106 MMOL/L (ref 98–107)
CHOLEST SERPL-MCNC: 139 MG/DL
CHOLEST/HDLC SERPL: 3 {RATIO} (ref 0–5)
CO2 SERPL-SCNC: 26 MMOL/L (ref 22–29)
COLOR UR AUTO: ABNORMAL
CREAT SERPL-MCNC: 0.88 MG/DL (ref 0.55–1.02)
CREAT UR-MCNC: 195.9 MG/DL (ref 47–110)
DEPRECATED CALCIDIOL+CALCIFEROL SERPL-MC: 30.1 NG/ML (ref 30–80)
EOSINOPHIL # BLD AUTO: 0.08 X10(3)/MCL (ref 0–0.9)
EOSINOPHIL NFR BLD AUTO: 1.7 %
ERYTHROCYTE [DISTWIDTH] IN BLOOD BY AUTOMATED COUNT: 13.8 % (ref 11–14.5)
EST. AVERAGE GLUCOSE BLD GHB EST-MCNC: 99.7 MG/DL
FERRITIN SERPL-MCNC: 5.23 NG/ML (ref 4.63–204)
GFR SERPLBLD CREATININE-BSD FMLA CKD-EPI: >60 MLS/MIN/1.73/M2
GLOBULIN SER-MCNC: 4.1 GM/DL (ref 2.4–3.5)
GLUCOSE SERPL-MCNC: 93 MG/DL (ref 74–100)
GLUCOSE UR QL STRIP.AUTO: NORMAL MG/DL
HAV IGM SERPL QL IA: NONREACTIVE
HBA1C MFR BLD: 5.1 %
HBV CORE IGM SERPL QL IA: NONREACTIVE
HBV SURFACE AG SERPL QL IA: NONREACTIVE
HCT VFR BLD AUTO: 35.6 % (ref 37–47)
HCV AB SERPL QL IA: NONREACTIVE
HDLC SERPL-MCNC: 43 MG/DL (ref 35–60)
HGB BLD-MCNC: 11.3 GM/DL (ref 12–16)
HYALINE CASTS #/AREA URNS LPF: ABNORMAL /LPF
IMM GRANULOCYTES # BLD AUTO: 0.02 X10(3)/MCL (ref 0–0.04)
IMM GRANULOCYTES NFR BLD AUTO: 0.4 %
IRON SATN MFR SERPL: 13 % (ref 20–50)
IRON SERPL-MCNC: 57 UG/DL (ref 50–170)
KETONES UR QL STRIP.AUTO: NEGATIVE MG/DL
LDLC SERPL CALC-MCNC: 88 MG/DL (ref 50–140)
LEUKOCYTE ESTERASE UR QL STRIP.AUTO: NEGATIVE UNIT/L
LYMPHOCYTES # BLD AUTO: 1.94 X10(3)/MCL (ref 0.6–4.6)
LYMPHOCYTES NFR BLD AUTO: 42.3 %
MCH RBC QN AUTO: 28.8 PG
MCHC RBC AUTO-ENTMCNC: 31.7 MG/DL (ref 33–36)
MCV RBC AUTO: 90.8 FL (ref 80–94)
MICROALBUMIN UR-MCNC: 9.1 UG/ML
MICROALBUMIN/CREAT RATIO PNL UR: 4.6 MG/GM CR (ref 0–30)
MONOCYTES # BLD AUTO: 0.4 X10(3)/MCL (ref 0.1–1.3)
MONOCYTES NFR BLD AUTO: 8.7 %
MUCOUS THREADS URNS QL MICRO: ABNORMAL /LPF
N GONORRHOEA DNA SPEC QL NAA+PROBE: NOT DETECTED
NEUTROPHILS # BLD AUTO: 2.11 X10(3)/MCL (ref 2.1–9.2)
NEUTROPHILS NFR BLD AUTO: 46 %
NITRITE UR QL STRIP.AUTO: NEGATIVE
NRBC BLD AUTO-RTO: 0 % (ref 0–1)
PH UR STRIP.AUTO: 6 [PH]
PLATELET # BLD AUTO: 255 X10(3)/MCL (ref 140–371)
PMV BLD AUTO: 10 FL (ref 9.4–12.4)
POTASSIUM SERPL-SCNC: 4.1 MMOL/L (ref 3.5–5.1)
PROT SERPL-MCNC: 8.1 GM/DL (ref 6.4–8.3)
PROT UR QL STRIP.AUTO: NEGATIVE MG/DL
RBC # BLD AUTO: 3.92 X10(6)/MCL (ref 4.2–5.4)
RBC #/AREA URNS AUTO: ABNORMAL /HPF
RBC UR QL AUTO: ABNORMAL UNIT/L
SODIUM SERPL-SCNC: 139 MMOL/L (ref 136–145)
SP GR UR STRIP.AUTO: 1.03
SQUAMOUS #/AREA URNS LPF: ABNORMAL /HPF
T4 FREE SERPL-MCNC: 0.99 NG/DL (ref 0.7–1.48)
TIBC SERPL-MCNC: 367 UG/DL (ref 70–310)
TIBC SERPL-MCNC: 424 UG/DL (ref 250–450)
TRANSFERRIN SERPL-MCNC: 383 MG/DL (ref 180–382)
TRIGL SERPL-MCNC: 40 MG/DL (ref 37–140)
TSH SERPL-ACNC: 1.57 UIU/ML (ref 0.35–4.94)
UROBILINOGEN UR STRIP-ACNC: NORMAL MG/DL
VLDLC SERPL CALC-MCNC: 8 MG/DL
WBC # SPEC AUTO: 4.6 X10(3)/MCL (ref 4.5–11.5)
WBC #/AREA URNS AUTO: ABNORMAL /HPF

## 2022-12-30 PROCEDURE — 83550 IRON BINDING TEST: CPT

## 2022-12-30 PROCEDURE — 80053 COMPREHEN METABOLIC PANEL: CPT

## 2022-12-30 PROCEDURE — 87491 CHLMYD TRACH DNA AMP PROBE: CPT

## 2022-12-30 PROCEDURE — 85025 COMPLETE CBC W/AUTO DIFF WBC: CPT

## 2022-12-30 PROCEDURE — 36415 COLL VENOUS BLD VENIPUNCTURE: CPT

## 2022-12-30 PROCEDURE — 80061 LIPID PANEL: CPT

## 2022-12-30 PROCEDURE — 81001 URINALYSIS AUTO W/SCOPE: CPT

## 2022-12-30 PROCEDURE — 82728 ASSAY OF FERRITIN: CPT

## 2022-12-30 PROCEDURE — 84443 ASSAY THYROID STIM HORMONE: CPT

## 2022-12-30 PROCEDURE — 82043 UR ALBUMIN QUANTITATIVE: CPT

## 2022-12-30 PROCEDURE — 82306 VITAMIN D 25 HYDROXY: CPT

## 2022-12-30 PROCEDURE — 84439 ASSAY OF FREE THYROXINE: CPT

## 2022-12-30 PROCEDURE — 80074 ACUTE HEPATITIS PANEL: CPT

## 2022-12-30 PROCEDURE — 87591 N.GONORRHOEAE DNA AMP PROB: CPT

## 2022-12-30 PROCEDURE — 83036 HEMOGLOBIN GLYCOSYLATED A1C: CPT

## 2022-12-31 LAB — PATH REV: NORMAL

## 2023-01-10 RX ORDER — FERROUS SULFATE 325(65) MG
1 TABLET ORAL 2 TIMES DAILY
COMMUNITY
Start: 2022-08-30 | End: 2023-01-11

## 2023-01-11 ENCOUNTER — OFFICE VISIT (OUTPATIENT)
Dept: INTERNAL MEDICINE | Facility: CLINIC | Age: 23
End: 2023-01-11
Payer: MEDICAID

## 2023-01-11 ENCOUNTER — TELEPHONE (OUTPATIENT)
Dept: GYNECOLOGY | Facility: CLINIC | Age: 23
End: 2023-01-11
Payer: MEDICAID

## 2023-01-11 DIAGNOSIS — N92.1 MENORRHAGIA WITH IRREGULAR CYCLE: ICD-10-CM

## 2023-01-11 DIAGNOSIS — D50.0 IRON DEFICIENCY ANEMIA DUE TO CHRONIC BLOOD LOSS: Primary | ICD-10-CM

## 2023-01-11 DIAGNOSIS — Z00.00 WELL ADULT EXAM: ICD-10-CM

## 2023-01-11 PROCEDURE — 99214 PR OFFICE/OUTPT VISIT, EST, LEVL IV, 30-39 MIN: ICD-10-PCS | Mod: 95,25,,

## 2023-01-11 PROCEDURE — 1160F PR REVIEW ALL MEDS BY PRESCRIBER/CLIN PHARMACIST DOCUMENTED: ICD-10-PCS | Mod: CPTII,95,,

## 2023-01-11 PROCEDURE — 99214 OFFICE O/P EST MOD 30 MIN: CPT | Mod: 95,25,,

## 2023-01-11 PROCEDURE — 1160F RVW MEDS BY RX/DR IN RCRD: CPT | Mod: CPTII,95,,

## 2023-01-11 PROCEDURE — 99395 PR PREVENTIVE VISIT,EST,18-39: ICD-10-PCS | Mod: 95,,,

## 2023-01-11 PROCEDURE — 1159F MED LIST DOCD IN RCRD: CPT | Mod: CPTII,95,,

## 2023-01-11 PROCEDURE — 99395 PREV VISIT EST AGE 18-39: CPT | Mod: 95,,,

## 2023-01-11 PROCEDURE — 1159F PR MEDICATION LIST DOCUMENTED IN MEDICAL RECORD: ICD-10-PCS | Mod: CPTII,95,,

## 2023-01-11 RX ORDER — FERROUS SULFATE 325(65) MG
325 TABLET ORAL DAILY
Qty: 90 TABLET | Refills: 1 | Status: SHIPPED | OUTPATIENT
Start: 2023-01-11 | End: 2023-09-26 | Stop reason: SDUPTHER

## 2023-01-11 NOTE — PROGRESS NOTES
VISIT DATE: 23    PATIENT NAME: Dinorah Agosto  : 2000  MRN: [id number]     The patient location is: [place], [place]  The chief complaint leading to consultation is: Lab review    Visit type: audio only    Audio time with patient: 16 minutes  30 minutes of total time spent on the encounter, which includes face to face time and non-face to face time preparing to see the patient (eg, review of tests), Obtaining and/or reviewing separately obtained history, Documenting clinical information in the electronic or other health record, Independently interpreting results (not separately reported) and communicating results to the patient/family/caregiver, or Care coordination (not separately reported).     Each patient to whom he or she provides medical services by telemedicine is:  (1) informed of the relationship between the physician and patient and the respective role of any other health care provider with respect to management of the patient; and (2) notified that he or she may decline to receive medical services by telemedicine and may withdraw from such care at any time.    Reason for visit / Chief Complaint:  Follow-up and Labs Only       History of Present Illness (HPI):  Dinorah Agosto is a 22 y.o. Black female presenting virtually by audio for Follow-up and Labs Only. PMH: HIV (acquired at birth), ADHD, herpes simplex, vitamin D deficiency, menorrhagia (Mirena insertion 2022), migraines (controlled with imitrex), seasonal allergies. She is followed by [facility] clinic. She voices compliance with Biktarvy. She was adopted as a , it was an open adoption. Her biological father passed away of HIV. Her biological mother is still living.     All pertinent labs dated 2022 and diagnotic tests reviewed and discussed with patient. She is having abnormal menstrual cycles. She says has been having to wear a pad daily since September. On heaviest days, she changes pad up to 8 times. She denies  cramping. She was previously prescribed iron supplements, but is now complete.      Denies smoking or illicit drug use. She drinks wine and/or liquor every other weekend. Denies chest pain, shortness of breath, cough, headache, dizziness, weakness, abdominal pain, nausea, vomiting, diarrhea, constipation, dysuria, depression, anxiety, SI, and HI.     Cervical Cancer Screening - Last Pap on 11/14/2022. Follow up with GYN Clinic for annual Pelvic.  Breast Cancer Screening - Deferred due to age.   Colon Cancer Screening - Deferred due to age.   Osteoporosis Screening - Deferred due to age.   Eye Exam - [facility]-June/July 2022.  Dental Exam - Several years. List of local dentists given to patient.  Vaccinations: Flu - 11/21/2022 / Tetanus - 5/1/2013 / Covid - 4/8/2021, 4/29/2021, 3/16/2022      Review of Systems     Review of Systems   Constitutional: Negative.    HENT: Negative.     Eyes: Negative.    Respiratory: Negative.     Cardiovascular: Negative.    Gastrointestinal: Negative.    Endocrine: Negative.    Genitourinary:  Positive for menstrual problem.   Musculoskeletal: Negative.    Skin: Negative.    Allergic/Immunologic: Negative.    Neurological: Negative.    Hematological: Negative.    Psychiatric/Behavioral: Negative.     All other systems reviewed and are negative.    Medical / Social / Family History     Past Medical History:   Diagnosis Date    ADHD     Anemia, unspecified     Herpesviral vesicular dermatitis     Human immunodeficiency virus (HIV) disease     Iron deficiency anemia, unspecified     Migraine     Other seasonal allergic rhinitis     Recurrent herpes simplex     Slow learner     Vitamin D deficiency          Past Surgical History:   Procedure Laterality Date    HERNIA REPAIR           Social History  Dinorah Agosto's  reports that she has never smoked. She has never used smokeless tobacco. She reports current alcohol use. She reports that she does not use drugs.    Family  History  Dinorah Agosto's family history includes HIV in her father and mother. She was adopted.    Medications and Allergies     Medications  Outpatient Medications Marked as Taking for the 1/11/23 encounter (Office Visit) with OLIVIA Sosa   Medication Sig Dispense Refill    BIKTARVY -25 mg (25 kg or greater) TAKE ONE TABLET BY MOUTH EVERY DAY 30 tablet 5    cetirizine (ZYRTEC) 10 MG tablet Take 10 mg by mouth daily as needed.      cholecalciferol, vitamin D3, (VITAMIN D3) 50 mcg (2,000 unit) Cap capsule Take 400 Int'l Units by mouth once daily.      sumatriptan (IMITREX) 25 MG Tab Take 25 mg by mouth 2 (two) times daily.      [DISCONTINUED] ferrous sulfate (FEOSOL) 325 mg (65 mg iron) Tab tablet Take 1 tablet by mouth 2 (two) times a day.         Allergies  Review of patient's allergies indicates:  No Known Allergies    Physical Examination   No vitals due to virtual visit.  Physical Exam  Pulmonary:      Effort: Pulmonary effort is normal.   Neurological:      General: No focal deficit present.      Mental Status: She is alert and oriented to person, place, and time.   Psychiatric:         Mood and Affect: Mood normal.         Behavior: Behavior normal.         Thought Content: Thought content normal.         Judgment: Judgment normal.         Results     Lab Results   Component Value Date    WBC 4.6 12/30/2022    RBC 3.92 (L) 12/30/2022    HGB 11.3 (L) 12/30/2022    HCT 35.6 (L) 12/30/2022    MCV 90.8 12/30/2022    MCH 28.8 12/30/2022    MCHC 31.7 (L) 12/30/2022    RDW 13.8 12/30/2022     12/30/2022    MPV 10.0 12/30/2022     Lab Results   Component Value Date     12/30/2022    K 4.1 12/30/2022    CO2 26 12/30/2022    BUN 17.0 12/30/2022    CREATININE 0.88 12/30/2022    CALCIUM 9.9 12/30/2022    ALBUMIN 4.0 12/30/2022    BILITOT 0.3 12/30/2022    ALKPHOS 52 12/30/2022    AST 18 12/30/2022    ALT 13 12/30/2022    EGFRIFAFRICA >105 12/16/2021    EGFRNONAA 98 12/16/2021     Lab  Results   Component Value Date    TSH 1.571 12/30/2022     Lab Results   Component Value Date    CHOL 139 12/30/2022    HDL 43 12/30/2022    LDL 88.00 12/30/2022    TRIG 40 12/30/2022     Lab Results   Component Value Date    COLORUA Light-Yellow 12/30/2022    SGUA 1.029 12/30/2022    PROTEINUA Negative 12/30/2022    GLUCOSEUA Normal 12/30/2022    BILIRUBINUA Negative 12/30/2022    BLOODUA 2+ (A) 12/30/2022    RBCUA 0-5 12/30/2022    BACTERIA None Seen 12/30/2022    NITRITE Negative 02/18/2020    LEUKOCYTESUR Negative 12/30/2022    UROBILINOGEN Normal 12/30/2022      No results found for: MICROALBRAYDEN XLUA26LCC  Lab Results   Component Value Date    KOJVXJQC10CV 30.1 12/30/2022     Lab Results   Component Value Date    HEPAIGM Nonreactive 12/30/2022    HEPBCOREM Nonreactive 12/30/2022    HEPCAB Nonreactive 12/30/2022     No results found for: FITDIAG COLOGUARD  No results found for: OMI [id number]        Assessment and Plan (including Health Maintenance)     Health Maintenance Due   Topic Date Due    COVID-19 Vaccine (5 - Booster) 05/11/2022       Problem List Items Addressed This Visit          Renal/    Menorrhagia with irregular cycle    Current Assessment & Plan     Mirena placed-11/14/2022.  Notify GYN clinic of persistent menstrual bleeding.         Relevant Medications    ferrous sulfate (FEOSOL) 325 mg (65 mg iron) Tab tablet       Oncology    Iron deficiency anemia due to chronic blood loss - Primary    Current Assessment & Plan     Lab Results   Component Value Date    HCT 35.6 (L) 12/30/2022    HGB 11.3 (L) 12/30/2022    FERRITIN 5.23 12/30/2022    TIBC 424 12/30/2022    LABIRON 13 (L) 12/30/2022   Rx ferrous sulfate.  Repeat iron studies and haptoglobin in 3 months.  Take iron supplements as prescribed.  Add Vitamin C to your diet.  Take iron and vitamin C on an empty stomach for better absorption if tolerated.  Add iron rich foods to diet such as liver, lean beef, eggs, dried fruit, dark green  leafy vegetables.  Education provided on additional sources of iron-rich foods.  Do NOT drink milk or take antacids at the same time you take your iron supplement.  Iron supplements can cause constipation; add stool softener or fiber as needed.          Relevant Medications    ferrous sulfate (FEOSOL) 325 mg (65 mg iron) Tab tablet    Other Relevant Orders    CBC Auto Differential    Iron and TIBC    Ferritin    Haptoglobin       Other    Well adult exam    Current Assessment & Plan     Wellness labs - 12/30/2022.  Cervical Cancer Screening - Last Pap on 11/14/2022. Follow up with GYN Clinic for annual Pelvic.  Breast Cancer Screening - Deferred due to age.   Colon Cancer Screening - Deferred due to age.   Osteoporosis Screening - Deferred due to age.   Eye Exam - [facility]-June/July 2022.  Dental Exam - Several years. List of local dentists given to patient.  Vaccinations: Flu - 11/21/2022 / Tetanus - 5/1/2013 / Covid - 4/8/2021, 4/29/2021, 3/16/2022            Health Maintenance Topics with due status: Not Due       Topic Last Completion Date    TETANUS VACCINE 05/01/2013    Pneumococcal Vaccines (Age 0-64) 02/18/2020       Future Appointments   Date Time Provider Department Center   11/15/2023 12:30 PM [facility] [place] [facility] [place] [facility]        RTC in 3 month(s) and prn.  Labs within a week of visit.             Signature:  OLIVIA Sosa  [facility]  [address] [facility] [address]    Date of encounter: 1/11/23

## 2023-01-11 NOTE — ASSESSMENT & PLAN NOTE
Lab Results   Component Value Date    HCT 35.6 (L) 12/30/2022    HGB 11.3 (L) 12/30/2022    FERRITIN 5.23 12/30/2022    TIBC 424 12/30/2022    LABIRON 13 (L) 12/30/2022   Rx ferrous sulfate.  Repeat iron studies and haptoglobin in 3 months.  Take iron supplements as prescribed.  Add Vitamin C to your diet.  Take iron and vitamin C on an empty stomach for better absorption if tolerated.  Add iron rich foods to diet such as liver, lean beef, eggs, dried fruit, dark green leafy vegetables.  Education provided on additional sources of iron-rich foods.  Do NOT drink milk or take antacids at the same time you take your iron supplement.  Iron supplements can cause constipation; add stool softener or fiber as needed.

## 2023-01-11 NOTE — PATIENT INSTRUCTIONS
REMINDER: Please complete labs within 1 week of appointment.   Please complete satisfaction survey when received. Thank you.

## 2023-01-11 NOTE — ASSESSMENT & PLAN NOTE
Wellness labs - 12/30/2022.  Cervical Cancer Screening - Last Pap on 11/14/2022. Follow up with GYN Clinic for annual Pelvic.  Breast Cancer Screening - Deferred due to age.   Colon Cancer Screening - Deferred due to age.   Osteoporosis Screening - Deferred due to age.   Eye Exam - [facility]-June/July 2022.  Dental Exam - Several years. List of local dentists given to patient.  Vaccinations: Flu - 11/21/2022 / Tetanus - 5/1/2013 / Covid - 4/8/2021, 4/29/2021, 3/16/2022

## 2023-01-11 NOTE — TELEPHONE ENCOUNTER
Was sent a message from her PCP that she is still bleeding with IUD.  Called patient, no answer, no voicemail.  Will try tomorrow

## 2023-01-13 ENCOUNTER — TELEPHONE (OUTPATIENT)
Dept: GYNECOLOGY | Facility: CLINIC | Age: 23
End: 2023-01-13
Payer: MEDICAID

## 2023-01-13 NOTE — TELEPHONE ENCOUNTER
Called patient again at request of PCP, no answer, no voicemail available.  Let PCP that i have attempted 2nd time to reach patient

## 2023-01-14 ENCOUNTER — PATIENT MESSAGE (OUTPATIENT)
Dept: INTERNAL MEDICINE | Facility: CLINIC | Age: 23
End: 2023-01-14
Payer: MEDICAID

## 2023-01-18 ENCOUNTER — TELEPHONE (OUTPATIENT)
Dept: GYNECOLOGY | Facility: CLINIC | Age: 23
End: 2023-01-18
Payer: MEDICAID

## 2023-01-18 NOTE — TELEPHONE ENCOUNTER
"----- Message from OLIVIA Sosa sent at 1/14/2023 12:12 PM CST -----  Regarding: RE: Heavy periods  HI Bhumika,    Thanks for being so diligent. I have sent Ms. Copeland a message in the portal to contact the clinic.    Thank you,    GARCIA Baca   ----- Message -----  From: Bhumika Maria RN  Sent: 1/13/2023   2:43 PM CST  To: OLIVIA Sosa, #  Subject: RE: Heavy periods                                I called again today, no answer, no way to leave a message.  If you have any other ideas to get in touch with her that would be great.  ----- Message -----  From: OLIVIA Sosa  Sent: 1/11/2023   7:24 PM CST  To: Bhumika Maria RN  Subject: RE: Heavy periods                                Thanks Bhumika.    -Rama  ----- Message -----  From: Bhumika Maria RN  Sent: 1/11/2023   4:32 PM CST  To: OLIVIA Sosa, #  Subject: RE: Heavy periods                                I've called the patient, no answer.  No voicemail apparently.  Will try tomorrow.  I'll get her story.  ----- Message -----  From: OLIVIA Sosa  Sent: 1/11/2023   1:58 PM CST  To: [facility] Clinical Support Staff  Subject: Heavy periods                                    Good afternoon,    Ms. Agosto is still having persistent menstrual bleeding since September. Mirena was placed at last office visit with GYN. Would she be able to be re-evaluated?     Thank you,    GARCIA Baca         No answer, recording "your party is not available, we are disconnecting this call".  Still unable to reach patient    "

## 2023-01-23 ENCOUNTER — TELEPHONE (OUTPATIENT)
Dept: INTERNAL MEDICINE | Facility: CLINIC | Age: 23
End: 2023-01-23
Payer: MEDICAID

## 2023-04-17 PROBLEM — Z00.00 WELL ADULT EXAM: Status: RESOLVED | Noted: 2023-01-11 | Resolved: 2023-04-17

## 2023-05-27 DIAGNOSIS — B20 HUMAN IMMUNODEFICIENCY VIRUS (HIV) DISEASE: ICD-10-CM

## 2023-05-30 ENCOUNTER — TELEPHONE (OUTPATIENT)
Dept: INTERNAL MEDICINE | Facility: CLINIC | Age: 23
End: 2023-05-30
Payer: MEDICAID

## 2023-05-30 DIAGNOSIS — D50.0 IRON DEFICIENCY ANEMIA DUE TO CHRONIC BLOOD LOSS: Primary | ICD-10-CM

## 2023-05-30 DIAGNOSIS — B20 HIV INFECTION, UNSPECIFIED SYMPTOM STATUS: ICD-10-CM

## 2023-05-30 RX ORDER — BICTEGRAVIR SODIUM, EMTRICITABINE, AND TENOFOVIR ALAFENAMIDE FUMARATE 50; 200; 25 MG/1; MG/1; MG/1
TABLET ORAL
Qty: 30 TABLET | Refills: 5 | Status: SHIPPED | OUTPATIENT
Start: 2023-05-30 | End: 2023-09-26 | Stop reason: SDUPTHER

## 2023-05-31 ENCOUNTER — TELEPHONE (OUTPATIENT)
Dept: INFECTIOUS DISEASES | Facility: CLINIC | Age: 23
End: 2023-05-31
Payer: MEDICAID

## 2023-05-31 NOTE — TELEPHONE ENCOUNTER
Received referral from OLIVIA Harmon to get patient established for HIV treatment.  Attempted to contact patient to schedule B20 Intake.  No answer.  Voicemail left.

## 2023-06-06 ENCOUNTER — TELEPHONE (OUTPATIENT)
Dept: INFECTIOUS DISEASES | Facility: CLINIC | Age: 23
End: 2023-06-06
Payer: MEDICAID

## 2023-06-08 ENCOUNTER — TELEPHONE (OUTPATIENT)
Dept: INFECTIOUS DISEASES | Facility: CLINIC | Age: 23
End: 2023-06-08
Payer: MEDICAID

## 2023-06-08 NOTE — TELEPHONE ENCOUNTER
Left message for patient to call back to schedule appt for transfer of care.  Referred to our clinic by TANI Rocha NP.

## 2023-06-21 ENCOUNTER — TELEPHONE (OUTPATIENT)
Dept: INFECTIOUS DISEASES | Facility: CLINIC | Age: 23
End: 2023-06-21
Payer: MEDICAID

## 2023-06-21 DIAGNOSIS — B20: Primary | ICD-10-CM

## 2023-06-21 NOTE — TELEPHONE ENCOUNTER
Pt calling office back, so patient was scheduled for an intake on 6/21/2023 @ 1300.  Pt was referred to our clinic by OLIVIA Harmon to get established for b20 care.

## 2023-06-30 ENCOUNTER — TELEPHONE (OUTPATIENT)
Dept: INFECTIOUS DISEASES | Facility: CLINIC | Age: 23
End: 2023-06-30
Payer: MEDICAID

## 2023-06-30 NOTE — TELEPHONE ENCOUNTER
Patient no showed for B20 intake on 06/21/2023. Attempted to contact patient to reschedule her appt. No answer. Voicemail left for her to call the clinic back.

## 2023-07-14 ENCOUNTER — PATIENT MESSAGE (OUTPATIENT)
Dept: INFECTIOUS DISEASES | Facility: CLINIC | Age: 23
End: 2023-07-14
Payer: MEDICAID

## 2023-07-14 NOTE — TELEPHONE ENCOUNTER
Attempted to contact patient. No answer. Voicemail left. Message sent to patient's portal. Unable to reach letter mailed to patient.

## 2023-07-21 NOTE — TELEPHONE ENCOUNTER
No response from patient from letter or portal message. Referring provider (Rama Rocha NP) notified. Referral transferred to big queue.

## 2023-08-11 ENCOUNTER — CLINICAL SUPPORT (OUTPATIENT)
Dept: INFECTIOUS DISEASES | Facility: CLINIC | Age: 23
End: 2023-08-11
Payer: MEDICAID

## 2023-08-11 ENCOUNTER — HOSPITAL ENCOUNTER (OUTPATIENT)
Dept: RADIOLOGY | Facility: HOSPITAL | Age: 23
Discharge: HOME | End: 2023-08-11
Attending: NURSE PRACTITIONER
Payer: MEDICAID

## 2023-08-11 ENCOUNTER — TELEPHONE (OUTPATIENT)
Dept: INFECTIOUS DISEASES | Facility: CLINIC | Age: 23
End: 2023-08-11
Payer: MEDICAID

## 2023-08-11 DIAGNOSIS — B20: ICD-10-CM

## 2023-08-11 LAB
ALBUMIN SERPL-MCNC: 4.1 G/DL (ref 3.5–5)
ALBUMIN/GLOB SERPL: 1 RATIO (ref 1.1–2)
ALP SERPL-CCNC: 54 UNIT/L (ref 40–150)
ALT SERPL-CCNC: 10 UNIT/L (ref 0–55)
AMPHET UR QL SCN: NEGATIVE
APPEARANCE UR: CLEAR
AST SERPL-CCNC: 20 UNIT/L (ref 5–34)
B-HCG SERPL QL: NEGATIVE
BACTERIA #/AREA URNS AUTO: ABNORMAL /HPF
BARBITURATE SCN PRESENT UR: NEGATIVE
BASOPHILS # BLD AUTO: 0.04 X10(3)/MCL
BASOPHILS NFR BLD AUTO: 0.7 %
BENZODIAZ UR QL SCN: NEGATIVE
BILIRUB SERPL-MCNC: 0.5 MG/DL
BILIRUB UR QL STRIP.AUTO: NEGATIVE
BUN SERPL-MCNC: 11.1 MG/DL (ref 7–18.7)
C TRACH DNA SPEC QL NAA+PROBE: NOT DETECTED
CALCIUM SERPL-MCNC: 10 MG/DL (ref 8.4–10.2)
CANNABINOIDS UR QL SCN: POSITIVE
CHLORIDE SERPL-SCNC: 105 MMOL/L (ref 98–107)
CHOLEST SERPL-MCNC: 125 MG/DL
CHOLEST/HDLC SERPL: 3 {RATIO} (ref 0–5)
CO2 SERPL-SCNC: 26 MMOL/L (ref 22–29)
COCAINE UR QL SCN: NEGATIVE
COLOR UR: ABNORMAL
CREAT SERPL-MCNC: 0.85 MG/DL (ref 0.55–1.02)
CRYPTOC AG SER QL IA.RAPID: NEGATIVE
CRYPTOC AG TITR CSF IA: NORMAL {TITER}
DEPRECATED CALCIDIOL+CALCIFEROL SERPL-MC: 30.2 NG/ML (ref 30–80)
EOSINOPHIL # BLD AUTO: 0.03 X10(3)/MCL (ref 0–0.9)
EOSINOPHIL NFR BLD AUTO: 0.5 %
ERYTHROCYTE [DISTWIDTH] IN BLOOD BY AUTOMATED COUNT: 12 % (ref 11.5–17)
EST. AVERAGE GLUCOSE BLD GHB EST-MCNC: 93.9 MG/DL
FENTANYL UR QL SCN: NEGATIVE
GFR SERPLBLD CREATININE-BSD FMLA CKD-EPI: >60 MLS/MIN/1.73/M2
GLOBULIN SER-MCNC: 4.1 GM/DL (ref 2.4–3.5)
GLUCOSE SERPL-MCNC: 64 MG/DL (ref 74–100)
GLUCOSE UR QL STRIP.AUTO: NORMAL
HAV AB SER QL IA: REACTIVE
HAV IGM SERPL QL IA: NONREACTIVE
HBA1C MFR BLD: 4.9 %
HBV CORE AB SERPL QL IA: NONREACTIVE
HBV CORE IGM SERPL QL IA: NONREACTIVE
HBV SURFACE AB SER-ACNC: 21.77 MIU/ML
HBV SURFACE AB SERPL IA-ACNC: REACTIVE M[IU]/ML
HBV SURFACE AG SERPL QL IA: NONREACTIVE
HCT VFR BLD AUTO: 41.2 % (ref 37–47)
HCV AB SERPL QL IA: NONREACTIVE
HDLC SERPL-MCNC: 37 MG/DL (ref 35–60)
HGB BLD-MCNC: 13.2 G/DL (ref 12–16)
HIV 1+2 AB+HIV1 P24 AG SERPL QL IA: REACTIVE
HYALINE CASTS #/AREA URNS LPF: ABNORMAL /LPF
IMM GRANULOCYTES # BLD AUTO: 0.07 X10(3)/MCL (ref 0–0.04)
IMM GRANULOCYTES NFR BLD AUTO: 1.2 %
KETONES UR QL STRIP.AUTO: NEGATIVE
LDLC SERPL CALC-MCNC: 80 MG/DL (ref 50–140)
LEUKOCYTE ESTERASE UR QL STRIP.AUTO: NEGATIVE
LYMPHOCYTES # BLD AUTO: 1.49 X10(3)/MCL (ref 0.6–4.6)
LYMPHOCYTES NFR BLD AUTO: 25 %
MCH RBC QN AUTO: 29.4 PG (ref 27–31)
MCHC RBC AUTO-ENTMCNC: 32 G/DL (ref 33–36)
MCV RBC AUTO: 91.8 FL (ref 80–94)
MDMA UR QL SCN: NEGATIVE
MONOCYTES # BLD AUTO: 0.31 X10(3)/MCL (ref 0.1–1.3)
MONOCYTES NFR BLD AUTO: 5.2 %
MUCOUS THREADS URNS QL MICRO: ABNORMAL /LPF
N GONORRHOEA DNA SPEC QL NAA+PROBE: NOT DETECTED
NEUTROPHILS # BLD AUTO: 4.01 X10(3)/MCL (ref 2.1–9.2)
NEUTROPHILS NFR BLD AUTO: 67.4 %
NITRITE UR QL STRIP.AUTO: NEGATIVE
NRBC BLD AUTO-RTO: 0 %
OPIATES UR QL SCN: NEGATIVE
PCP UR QL: NEGATIVE
PH UR STRIP.AUTO: 7 [PH]
PH UR: 7 [PH] (ref 3–11)
PLATELET # BLD AUTO: 290 X10(3)/MCL (ref 130–400)
PMV BLD AUTO: 10.2 FL (ref 7.4–10.4)
POTASSIUM SERPL-SCNC: 3.4 MMOL/L (ref 3.5–5.1)
PROT SERPL-MCNC: 8.2 GM/DL (ref 6.4–8.3)
PROT UR QL STRIP.AUTO: NEGATIVE
PSA SERPL-MCNC: 0 NG/ML
RBC # BLD AUTO: 4.49 X10(6)/MCL (ref 4.2–5.4)
RBC #/AREA URNS AUTO: ABNORMAL /HPF
RBC UR QL AUTO: NEGATIVE
SODIUM SERPL-SCNC: 141 MMOL/L (ref 136–145)
SOURCE (OHS): NORMAL
SP GR UR STRIP.AUTO: 1.02
SQUAMOUS #/AREA URNS LPF: ABNORMAL /HPF
T PALLIDUM AB SER QL: NONREACTIVE
TRIGL SERPL-MCNC: 40 MG/DL (ref 37–140)
TSH SERPL-ACNC: 0.94 UIU/ML (ref 0.35–4.94)
UROBILINOGEN UR STRIP-ACNC: NORMAL
VLDLC SERPL CALC-MCNC: 8 MG/DL
WBC # SPEC AUTO: 5.95 X10(3)/MCL (ref 4.5–11.5)
WBC #/AREA URNS AUTO: ABNORMAL /HPF

## 2023-08-11 PROCEDURE — 86701 HIV-1ANTIBODY: CPT

## 2023-08-11 PROCEDURE — 86708 HEPATITIS A ANTIBODY: CPT

## 2023-08-11 PROCEDURE — 87536 HIV-1 QUANT&REVRSE TRNSCRPJ: CPT

## 2023-08-11 PROCEDURE — 86777 TOXOPLASMA ANTIBODY: CPT

## 2023-08-11 PROCEDURE — 87389 HIV-1 AG W/HIV-1&-2 AB AG IA: CPT

## 2023-08-11 PROCEDURE — 80053 COMPREHEN METABOLIC PANEL: CPT

## 2023-08-11 PROCEDURE — 86644 CMV ANTIBODY: CPT

## 2023-08-11 PROCEDURE — 87536 HIV-1 QUANT&REVRSE TRNSCRPJ: CPT | Mod: 91

## 2023-08-11 PROCEDURE — 83036 HEMOGLOBIN GLYCOSYLATED A1C: CPT

## 2023-08-11 PROCEDURE — 71046 X-RAY EXAM CHEST 2 VIEWS: CPT | Mod: TC

## 2023-08-11 PROCEDURE — 81025 URINE PREGNANCY TEST: CPT

## 2023-08-11 PROCEDURE — 82306 VITAMIN D 25 HYDROXY: CPT

## 2023-08-11 PROCEDURE — 86480 TB TEST CELL IMMUN MEASURE: CPT

## 2023-08-11 PROCEDURE — 86778 TOXOPLASMA ANTIBODY IGM: CPT

## 2023-08-11 PROCEDURE — 81001 URINALYSIS AUTO W/SCOPE: CPT

## 2023-08-11 PROCEDURE — 84153 ASSAY OF PSA TOTAL: CPT

## 2023-08-11 PROCEDURE — 80074 ACUTE HEPATITIS PANEL: CPT

## 2023-08-11 PROCEDURE — 86361 T CELL ABSOLUTE COUNT: CPT

## 2023-08-11 PROCEDURE — 87899 AGENT NOS ASSAY W/OPTIC: CPT

## 2023-08-11 PROCEDURE — 36415 COLL VENOUS BLD VENIPUNCTURE: CPT

## 2023-08-11 PROCEDURE — 86702 HIV-2 ANTIBODY: CPT

## 2023-08-11 PROCEDURE — 80307 DRUG TEST PRSMV CHEM ANLYZR: CPT

## 2023-08-11 PROCEDURE — 82955 ASSAY OF G6PD ENZYME: CPT

## 2023-08-11 PROCEDURE — 81381 HLA I TYPING 1 ALLELE HR: CPT

## 2023-08-11 PROCEDURE — 87591 N.GONORRHOEAE DNA AMP PROB: CPT

## 2023-08-11 PROCEDURE — 80061 LIPID PANEL: CPT

## 2023-08-11 PROCEDURE — 84443 ASSAY THYROID STIM HORMONE: CPT

## 2023-08-11 PROCEDURE — 86706 HEP B SURFACE ANTIBODY: CPT

## 2023-08-11 PROCEDURE — 99212 OFFICE O/P EST SF 10 MIN: CPT | Mod: PBBFAC,25

## 2023-08-11 PROCEDURE — 86704 HEP B CORE ANTIBODY TOTAL: CPT

## 2023-08-11 PROCEDURE — 86780 TREPONEMA PALLIDUM: CPT

## 2023-08-11 PROCEDURE — 85025 COMPLETE CBC W/AUTO DIFF WBC: CPT

## 2023-08-11 NOTE — PROGRESS NOTES
B20 Intake    Patient presents to clinic to transition her care from Dr. Mcrae to [facility]. She was referred from Rama Rocha NP (PCP) to get established in the clinic. Patient acquired HIV at birth. Her birth parents were both HIV positive. Her birth dad passed away from HIV related issues. She states she has always taken medicine for HIV but she just found out she has it 3 years ago. She is currently on Biktarvy and is doing well on it. FSM. She states she was in a long term relationship with her ex-boyfriend for 3 years until they broke up in January 2023. Protection was used sometimes. After she broke up with her boyfriend, she had 6 sexual partners with her last one being 2 months ago; protection used. She currently has Mirena for birth control. At the end of the intake, Kayley Bello NP spoke with the patient.     HIV Test Date: acquired at birth       Location: [facility] at [facility]    CD4: 786 (as of 03/21/2022)  Viral Load: undetectable (as of 03/16/2022)    Current ARV Therapy: Biktarvy, last dose was yesterday    Who knows of status: foster mom, foster sisters, ex-boyfriend, close friend, old and new boss    Marital Status:  SINGLE         Children: none    Risk Factors:   Blood Transfusion: denies   IVDA: denies; admits to marijuana, last use January 2023   Tattoos: 1, professionally done   Piercings: ears, professionally done    Hx of STD: herpes simplex    Previous Opportunistic Infection: denies    PMH: ADHD, iron deficiency anemia, migraines, allergic rhinitis, vitamin D deficiency    PSH: hernia repair    Mental Health Issues: denies    ETOH Use: yes, occasionally    Incarceration: denies    Pending Warrants: denies      Discussed clinic flow, disease process, including potential for resistance, and treatment goals.  Stressed importance of medication adherence, keeping appointments, and using safe sex practices.  Encouraged to maintain, good health, hygiene, and nutrition.  Follow up appointment  set with Kayley Bello NP on 08/30/2023 at 10:30am.  Directed patient to [facility] to have lab work done and Radiology to have CXR completed.

## 2023-08-11 NOTE — PROGRESS NOTES
Reviewed labs. Potassium is low at 3.4.  Please contact the patient with the following recommendations:  Increase intake of bananas, oranges, melons, dark leafy greens, electrolyte replacement drinks, potatoes.

## 2023-08-11 NOTE — TELEPHONE ENCOUNTER
----- Message from OLIVIA Patterson sent at 8/11/2023 11:18 AM CDT -----  Reviewed labs. Potassium is low at 3.4.  Please contact the patient with the following recommendations:  Increase intake of bananas, oranges, melons, dark leafy greens, electrolyte replacement drinks, potatoes.

## 2023-08-13 LAB
CMV IGG SERPL QL IA: NEGATIVE
T GONDII IGG SER QL IA: NEGATIVE
T GONDII IGG SER-ACNC: <3 IU/ML
T GONDII IGM SERPL QL IA: NEGATIVE

## 2023-08-14 LAB
AGE: >18
CD3+CD4+ CELLS # SPEC: 660.75 UNIT/L (ref 589–1505)
CD3+CD4+ CELLS NFR BLD: 44.42 %
G6PD RBC-CCNT: 6.3 U/G HB (ref 8–11.9)
HIV 2 AB SERPLBLD QL IA.RAPID: NEGATIVE
HIV1 AB SERPLBLD QL IA.RAPID: POSITIVE
HIV1 RNA # PLAS NAA DL=20: NORMAL COPIES/ML
LYMPHOCYTES # BLD AUTO: 1487.5 X10(3)/MCL (ref 1260–5520)
LYMPHOCYTES NFR LN MANUAL: 25 % (ref 28–48)
LYMPHOMA - T-CELL MARKERS SPEC-IMP: ABNORMAL
PATH REV: NORMAL
WBC # BLD AUTO: 5950 /MM3 (ref 4500–11500)

## 2023-08-15 LAB
GAMMA INTERFERON BACKGROUND BLD IA-ACNC: 0.01 IU/ML
HIV1 RNA # PLAS NAA DL=20: NORMAL COPIES/ML
M TB IFN-G BLD-IMP: NEGATIVE
M TB IFN-G CD4+ BCKGRND COR BLD-ACNC: 0 IU/ML
M TB IFN-G CD4+CD8+ BCKGRND COR BLD-ACNC: 0.01 IU/ML
MITOGEN IGNF BCKGRD COR BLD-ACNC: 9.99 IU/ML

## 2023-08-18 LAB
ADVERSE DRUG SEQ VAR INTERP BLD/T-IMP: NORMAL
GENETICIST REVIEW: NORMAL
HLA-B*57:01 QL: NEGATIVE
LAB TEST METHOD: NORMAL
PROVIDER SIGNING NAME: NORMAL
TEST PERFORMANCE INFO SPEC: NORMAL

## 2023-09-26 ENCOUNTER — OFFICE VISIT (OUTPATIENT)
Dept: INFECTIOUS DISEASES | Facility: CLINIC | Age: 23
End: 2023-09-26
Payer: MEDICAID

## 2023-09-26 ENCOUNTER — PATIENT MESSAGE (OUTPATIENT)
Dept: INFECTIOUS DISEASES | Facility: CLINIC | Age: 23
End: 2023-09-26

## 2023-09-26 VITALS
TEMPERATURE: 98 F | DIASTOLIC BLOOD PRESSURE: 65 MMHG | SYSTOLIC BLOOD PRESSURE: 103 MMHG | RESPIRATION RATE: 16 BRPM | BODY MASS INDEX: 21.03 KG/M2 | HEIGHT: 60 IN | WEIGHT: 107.13 LBS | HEART RATE: 76 BPM

## 2023-09-26 DIAGNOSIS — D75.A G6PD DEFICIENCY: ICD-10-CM

## 2023-09-26 DIAGNOSIS — D50.0 IRON DEFICIENCY ANEMIA DUE TO CHRONIC BLOOD LOSS: ICD-10-CM

## 2023-09-26 DIAGNOSIS — B20: Primary | ICD-10-CM

## 2023-09-26 DIAGNOSIS — Z23 IMMUNIZATION DUE: ICD-10-CM

## 2023-09-26 PROCEDURE — 3078F PR MOST RECENT DIASTOLIC BLOOD PRESSURE < 80 MM HG: ICD-10-PCS | Mod: CPTII,,, | Performed by: NURSE PRACTITIONER

## 2023-09-26 PROCEDURE — 3044F PR MOST RECENT HEMOGLOBIN A1C LEVEL <7.0%: ICD-10-PCS | Mod: CPTII,,, | Performed by: NURSE PRACTITIONER

## 2023-09-26 PROCEDURE — 99204 PR OFFICE/OUTPT VISIT, NEW, LEVL IV, 45-59 MIN: ICD-10-PCS | Mod: S$PBB,,, | Performed by: NURSE PRACTITIONER

## 2023-09-26 PROCEDURE — 3078F DIAST BP <80 MM HG: CPT | Mod: CPTII,,, | Performed by: NURSE PRACTITIONER

## 2023-09-26 PROCEDURE — 3074F SYST BP LT 130 MM HG: CPT | Mod: CPTII,,, | Performed by: NURSE PRACTITIONER

## 2023-09-26 PROCEDURE — 3008F BODY MASS INDEX DOCD: CPT | Mod: CPTII,,, | Performed by: NURSE PRACTITIONER

## 2023-09-26 PROCEDURE — 3074F PR MOST RECENT SYSTOLIC BLOOD PRESSURE < 130 MM HG: ICD-10-PCS | Mod: CPTII,,, | Performed by: NURSE PRACTITIONER

## 2023-09-26 PROCEDURE — 1159F MED LIST DOCD IN RCRD: CPT | Mod: CPTII,,, | Performed by: NURSE PRACTITIONER

## 2023-09-26 PROCEDURE — 90472 IMMUNIZATION ADMIN EACH ADD: CPT | Mod: PBBFAC

## 2023-09-26 PROCEDURE — 99215 OFFICE O/P EST HI 40 MIN: CPT | Mod: PBBFAC | Performed by: NURSE PRACTITIONER

## 2023-09-26 PROCEDURE — 3044F HG A1C LEVEL LT 7.0%: CPT | Mod: CPTII,,, | Performed by: NURSE PRACTITIONER

## 2023-09-26 PROCEDURE — 1160F RVW MEDS BY RX/DR IN RCRD: CPT | Mod: CPTII,,, | Performed by: NURSE PRACTITIONER

## 2023-09-26 PROCEDURE — 3008F PR BODY MASS INDEX (BMI) DOCUMENTED: ICD-10-PCS | Mod: CPTII,,, | Performed by: NURSE PRACTITIONER

## 2023-09-26 PROCEDURE — 1159F PR MEDICATION LIST DOCUMENTED IN MEDICAL RECORD: ICD-10-PCS | Mod: CPTII,,, | Performed by: NURSE PRACTITIONER

## 2023-09-26 PROCEDURE — 99204 OFFICE O/P NEW MOD 45 MIN: CPT | Mod: S$PBB,,, | Performed by: NURSE PRACTITIONER

## 2023-09-26 PROCEDURE — 90715 TDAP VACCINE 7 YRS/> IM: CPT | Mod: PBBFAC

## 2023-09-26 PROCEDURE — 1160F PR REVIEW ALL MEDS BY PRESCRIBER/CLIN PHARMACIST DOCUMENTED: ICD-10-PCS | Mod: CPTII,,, | Performed by: NURSE PRACTITIONER

## 2023-09-26 PROCEDURE — 90686 IIV4 VACC NO PRSV 0.5 ML IM: CPT | Mod: PBBFAC

## 2023-09-26 RX ORDER — FERROUS SULFATE 325(65) MG
325 TABLET ORAL DAILY
Qty: 90 TABLET | Refills: 1 | Status: SHIPPED | OUTPATIENT
Start: 2023-09-26 | End: 2024-03-26 | Stop reason: SDUPTHER

## 2023-09-26 RX ORDER — BICTEGRAVIR SODIUM, EMTRICITABINE, AND TENOFOVIR ALAFENAMIDE FUMARATE 50; 200; 25 MG/1; MG/1; MG/1
1 TABLET ORAL DAILY
Qty: 30 TABLET | Refills: 5 | Status: SHIPPED | OUTPATIENT
Start: 2023-09-26 | End: 2024-03-26 | Stop reason: SDUPTHER

## 2023-09-26 NOTE — PROGRESS NOTES
Patient ID: Dinorah Agosto 23 y.o.     Chief Complaint:   Chief Complaint   Patient presents with    New referral      B20        HPI:    Dinorah Agosto is a 22 y/o [facility] AIDS patient here for initial visit. She was previously followed in peds clinic by Dr. Mcrae. Pt acquired HIV @ birth, but her adopted mother did not tell her she was positive until 3 years ago when she became sexually active. Pt states she has taken meds daily since birth, but never questioned what her mother was giving her.  Current ART regimen is Biktarvy. Pt states she is tolerating the meds well.  Denies fever, headache, chills, visual problems, N/V/D, SOB, cough, chest pain or abd pain. Reviewed labs from 8/11/23 HIV VL ND, CD4 660, syphilis ab NR, quant gold neg, Vit D 30.2, lipid WNL, K 3.4, G6PD def low, HLA  neg, toxo IGG/ IGM neg, CMV IGG neg, crypto neg, hep C ab NR, Hep B s ab reactive, Hep A IGG +.  Pt is followed in gyn clinic by OLIVIA Pierce.  She has a Mirena.  Next appt 11/15/23. Hx of iron def anemia. Pt reports adherence to iron supplement.  She will need additional labs. I will order. Pt is due for a Tdap and flu vaccine. She is amenable.  Pt will need an updated fundus photo. I will order.         Past Medical History:   Diagnosis Date    ADHD     Anemia, unspecified     Herpesviral vesicular dermatitis     Human immunodeficiency virus (HIV) disease     Iron deficiency anemia, unspecified     Migraine     Other seasonal allergic rhinitis     Recurrent herpes simplex     Slow learner     Vitamin D deficiency         Past Surgical History:   Procedure Laterality Date    HERNIA REPAIR          Social History     Socioeconomic History    Marital status: Single   Occupational History    Occupation:    Tobacco Use    Smoking status: Never    Smokeless tobacco: Never   Substance and Sexual Activity    Alcohol use: Yes     Alcohol/week: 5.0 standard drinks of alcohol     Types: 3 Glasses of wine, 2 Shots of  liquor per week     Comment: every other weekend wine + liquor    Drug use: Never     Types: Marijuana     Comment: last use in January 2023    Sexual activity: Not Currently     Partners: Female     Birth control/protection: Other-see comments     Comment: Mirena        Family History   Adopted: Yes   Problem Relation Age of Onset    HIV Mother     HIV Father         Review of patient's allergies indicates:  No Known Allergies     Immunization History   Administered Date(s) Administered    COVID-19, MRNA, LN-S, PF (Pfizer) (Gray Cap) 03/16/2022    COVID-19, MRNA, LN-S, PF (Pfizer) (Purple Cap) 04/08/2021, 04/29/2021, 03/16/2022    DTaP 2002, 2002, 2002, 09/18/2003, 02/19/2009    HIB 2002, 2002, 2002, 09/18/2003    HPV 9-Valent 05/01/2013, 02/17/2014, 05/13/2014    HPV Quadrivalent 05/01/2013, 02/17/2014, 05/13/2014    Hepatitis A, Pediatric/Adolescent, 2 Dose 02/19/2009, 05/27/2010    Hepatitis B, Adult 09/29/2016    Hepatitis B, Pediatric/Adolescent 2002, 2002, 09/18/2003    IPV 2002, 2002, 2002, 09/18/2003, 08/10/2006    Influenza - Intranasal 09/08/2010    Influenza - Intranasal - Trivalent 09/08/2010    Influenza - Quadrivalent 09/30/2021    Influenza - Quadrivalent - PF *Preferred* (6 months and older) 10/16/2003, 10/27/2004, 11/08/2005, 11/06/2007, 12/02/2008, 09/17/2009, 10/20/2011, 10/31/2012, 12/18/2014, 11/15/2016, 11/15/2016, 09/20/2017, 03/12/2019, 02/18/2020, 11/04/2020, 09/30/2021, 11/21/2022    Influenza - Trivalent - PF (ADULT) 10/16/2003, 10/27/2004, 11/08/2005, 11/06/2007, 12/02/2008, 09/17/2009, 10/20/2011, 10/31/2012    Influenza A (H1N1) 2009 Monovalent - IM 12/10/2009    MMR 02/07/2003, 02/19/2009    Meningococcal B, OMV 03/12/2019, 09/03/2019    Meningococcal Conjugate (MCV4P) 05/01/2013, 09/10/2018    Pneumococcal Conjugate - 13 Valent 05/27/2010, 09/03/2019    Pneumococcal Conjugate - 7 Valent 2002, 2002,  03/07/2003    Pneumococcal Polysaccharide - 23 Valent 11/04/2004, 02/18/2020    Tdap 05/01/2013    Varicella 03/07/2003, 02/19/2009        Review of Systems   Constitutional: Negative.    HENT: Negative.     Eyes: Negative.    Respiratory: Negative.     Cardiovascular: Negative.    Gastrointestinal: Negative.    Genitourinary: Negative.    Musculoskeletal: Negative.    Skin: Negative.    Neurological: Negative.    Endo/Heme/Allergies: Negative.    Psychiatric/Behavioral: Negative.     All other systems reviewed and are negative.         Objective:      /65   Pulse 76   Temp 98.2 °F (36.8 °C)   Resp 16   Ht 5' (1.524 m)   Wt 48.6 kg (107 lb 1.6 oz)   LMP  (LMP Unknown)   BMI 20.92 kg/m²      Physical Exam  Vitals reviewed.   Constitutional:       General: She is not in acute distress.     Appearance: Normal appearance.   HENT:      Head: Normocephalic.      Right Ear: External ear normal.      Left Ear: External ear normal.      Nose: Nose normal.      Mouth/Throat:      Mouth: Mucous membranes are moist.      Pharynx: Oropharynx is clear.   Eyes:      General: No scleral icterus.     Extraocular Movements: Extraocular movements intact.      Conjunctiva/sclera: Conjunctivae normal.      Pupils: Pupils are equal, round, and reactive to light.   Cardiovascular:      Rate and Rhythm: Normal rate and regular rhythm.      Pulses: Normal pulses.      Heart sounds: Normal heart sounds.   Pulmonary:      Effort: Pulmonary effort is normal. No respiratory distress.      Breath sounds: Normal breath sounds.   Abdominal:      General: Bowel sounds are normal. There is no distension.      Palpations: Abdomen is soft. There is no mass.      Tenderness: There is no abdominal tenderness. There is no right CVA tenderness or left CVA tenderness.      Hernia: No hernia is present.   Musculoskeletal:         General: No tenderness or signs of injury. Normal range of motion.      Cervical back: Normal range of motion and  neck supple.      Right lower leg: No edema.      Left lower leg: No edema.   Lymphadenopathy:      Cervical: No cervical adenopathy.   Skin:     General: Skin is warm and dry.      Capillary Refill: Capillary refill takes less than 2 seconds.      Findings: No erythema or lesion.   Neurological:      General: No focal deficit present.      Mental Status: She is alert and oriented to person, place, and time. Mental status is at baseline.   Psychiatric:         Mood and Affect: Mood normal.         Behavior: Behavior normal.         Thought Content: Thought content normal.         Judgment: Judgment normal.          Labs:   Lab Results   Component Value Date    WBC 5.95 08/11/2023    HGB 13.2 08/11/2023    HCT 41.2 08/11/2023    MCV 91.8 08/11/2023     08/11/2023       CMP  Sodium Level   Date Value Ref Range Status   08/11/2023 141 136 - 145 mmol/L Final     Potassium Level   Date Value Ref Range Status   08/11/2023 3.4 (L) 3.5 - 5.1 mmol/L Final     Carbon Dioxide   Date Value Ref Range Status   08/11/2023 26 22 - 29 mmol/L Final     Blood Urea Nitrogen   Date Value Ref Range Status   08/11/2023 11.1 7.0 - 18.7 mg/dL Final     Creatinine   Date Value Ref Range Status   08/11/2023 0.85 0.55 - 1.02 mg/dL Final     Calcium Level Total   Date Value Ref Range Status   08/11/2023 10.0 8.4 - 10.2 mg/dL Final     Albumin Level   Date Value Ref Range Status   08/11/2023 4.1 3.5 - 5.0 g/dL Final     Bilirubin Total   Date Value Ref Range Status   08/11/2023 0.5 <=1.5 mg/dL Final     Alkaline Phosphatase   Date Value Ref Range Status   08/11/2023 54 40 - 150 unit/L Final     Aspartate Aminotransferase   Date Value Ref Range Status   08/11/2023 20 5 - 34 unit/L Final     Alanine Aminotransferase   Date Value Ref Range Status   08/11/2023 10 0 - 55 unit/L Final     eGFR   Date Value Ref Range Status   08/11/2023 >60 mls/min/1.73/m2 Final     Lab Results   Component Value Date    TSH 0.938 08/11/2023     Hep C Ab Interp    Date Value Ref Range Status   08/11/2023 Nonreactive Nonreactive Final     Syphilis Antibody   Date Value Ref Range Status   08/11/2023 Nonreactive Nonreactive, Equivocal Final     Cholesterol Total   Date Value Ref Range Status   08/11/2023 125 <=200 mg/dL Final     HDL Cholesterol   Date Value Ref Range Status   08/11/2023 37 35 - 60 mg/dL Final     Triglyceride   Date Value Ref Range Status   08/11/2023 40 37 - 140 mg/dL Final     Cholesterol/HDL Ratio   Date Value Ref Range Status   08/11/2023 3 0 - 5 Final     Very Low Density Lipoprotein   Date Value Ref Range Status   08/11/2023 8  Final     LDL Cholesterol   Date Value Ref Range Status   08/11/2023 80.00 50.00 - 140.00 mg/dL Final     Vit D 25 OH   Date Value Ref Range Status   08/11/2023 30.2 30.0 - 80.0 ng/mL Final       Imaging: Reviewed most recent relevant imaging studies available, notable results highlighted in this note    Medications:     Current Outpatient Medications   Medication Instructions    dleeqkrwt-jmdrfydf-mcxrslt ala (BIKTARVY) -25 mg (25 kg or greater) 1 tablet, Oral, Daily    cetirizine (ZYRTEC) 10 mg, Oral, Daily PRN    cholecalciferol, vitamin D3, (VITAMIN D3) 50 mcg (2,000 unit) Cap capsule 400 Int'l Units, Oral, Daily    ferrous sulfate (FEOSOL) 325 mg, Oral, Daily    sumatriptan (IMITREX) 25 mg, Oral, As needed (PRN)       Assessment:       Problem List Items Addressed This Visit          ID    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 - Primary    Relevant Medications    oriqsifdh-ausepenr-reolibh ala (BIKTARVY) -25 mg (25 kg or greater)    Other Relevant Orders    Ambulatory referral/consult to Ophthalmology       Oncology    Iron deficiency anemia due to chronic blood loss    Relevant Medications    ferrous sulfate (FEOSOL) 325 mg (65 mg iron) Tab tablet     Other Visit Diagnoses       G6PD deficiency        Immunization due        Relevant Orders    Tdap Vaccine    Influenza -  Quadrivalent (PF)               Plan:      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  Dispense: 30 tablet; Refill: 5  -     Ambulatory referral/consult to Ophthalmology; Future; Expected date: 10/03/2023  Extensive education provided regarding adherence, sexual health, medication management, attending scheduled visits, risks and benefits of medication.  Use condoms for all sexual encounters.  Consider complete abstinence until virally suppressed.  Notify sexual partner(s) of disease status.   Uncontrolled HIV can cause not only a weakened immune system & leave one susceptible to opportunistic infections, but can also lead to renal, cardiac, neurological, cardiovascular, and hepatic dysfunction as a result of chronic inflammation.  Take all medications as prescribed and keep follow-up with providers as scheduled.   Incorrect use of HIV medications can lead to developing resistance and loss of control of virus.  This can also limit future treatment options.   Notify our office for any concerns that may emerge, particularly if you are having trouble with obtaining medication or changes in insurance status so that we may assist you.           Continue Biktarvy 1 po q day as directed   Adherence and sexual health counseling done.  Use condoms for all sexual encounters.  Blood precautions.  Labs today   RTC 6 months with Kayley for an in office visit  Referred to eye clinic   Scheduled for gyn 11/15/23    G6PD deficiency  Avoid andrez beans, ASA, and Sulfonamides (Dapsone, Macrobid, Primaquine, etc).     Iron deficiency anemia due to chronic blood loss  -     ferrous sulfate (FEOSOL) 325 mg (65 mg iron) Tab tablet; Take 1 tablet (325 mg total) by mouth once daily.  Dispense: 90 tablet; Refill: 1  Continue iron supplement   Pt will need updated labs today     Immunization  due  -     Tdap Vaccine  -     Influenza - Quadrivalent (PF); Future; Expected date: 09/26/2023         56 minutes of total time spent on the encounter, which includes face to face time and non-face to face time preparing to see the patient (eg, review of tests), Obtaining and/or reviewing separately obtained history, Documenting clinical information in the electronic or other health record, Independently interpreting results (not separately reported) and communicating results to the patient/family/caregiver, or Care coordination (not separately reported).

## 2024-03-26 ENCOUNTER — OFFICE VISIT (OUTPATIENT)
Dept: INFECTIOUS DISEASES | Facility: CLINIC | Age: 24
End: 2024-03-26

## 2024-03-26 ENCOUNTER — TELEPHONE (OUTPATIENT)
Dept: INFECTIOUS DISEASES | Facility: CLINIC | Age: 24
End: 2024-03-26

## 2024-03-26 VITALS
SYSTOLIC BLOOD PRESSURE: 115 MMHG | HEART RATE: 82 BPM | RESPIRATION RATE: 16 BRPM | BODY MASS INDEX: 19.3 KG/M2 | HEIGHT: 60 IN | TEMPERATURE: 98 F | WEIGHT: 98.31 LBS | DIASTOLIC BLOOD PRESSURE: 77 MMHG

## 2024-03-26 DIAGNOSIS — D75.A G6PD DEFICIENCY: ICD-10-CM

## 2024-03-26 DIAGNOSIS — B20 AIDS: Primary | ICD-10-CM

## 2024-03-26 DIAGNOSIS — E55.9 VITAMIN D DEFICIENCY: ICD-10-CM

## 2024-03-26 DIAGNOSIS — D50.9 IRON DEFICIENCY ANEMIA, UNSPECIFIED IRON DEFICIENCY ANEMIA TYPE: ICD-10-CM

## 2024-03-26 LAB
ALBUMIN SERPL-MCNC: 4.4 G/DL (ref 3.5–5)
ALBUMIN/GLOB SERPL: 1.1 RATIO (ref 1.1–2)
ALP SERPL-CCNC: 48 UNIT/L (ref 40–150)
ALT SERPL-CCNC: 20 UNIT/L (ref 0–55)
AST SERPL-CCNC: 51 UNIT/L (ref 5–34)
BASOPHILS # BLD AUTO: 0.05 X10(3)/MCL
BASOPHILS NFR BLD AUTO: 0.9 %
BILIRUB SERPL-MCNC: 0.4 MG/DL
BUN SERPL-MCNC: 10.1 MG/DL (ref 7–18.7)
CALCIUM SERPL-MCNC: 10.4 MG/DL (ref 8.4–10.2)
CHLORIDE SERPL-SCNC: 105 MMOL/L (ref 98–107)
CO2 SERPL-SCNC: 27 MMOL/L (ref 22–29)
CREAT SERPL-MCNC: 0.83 MG/DL (ref 0.55–1.02)
DEPRECATED CALCIDIOL+CALCIFEROL SERPL-MC: 24.1 NG/ML (ref 30–80)
EOSINOPHIL # BLD AUTO: 0.03 X10(3)/MCL (ref 0–0.9)
EOSINOPHIL NFR BLD AUTO: 0.5 %
ERYTHROCYTE [DISTWIDTH] IN BLOOD BY AUTOMATED COUNT: 12.4 % (ref 11.5–17)
FERRITIN SERPL-MCNC: 49.03 NG/ML (ref 4.63–204)
GFR SERPLBLD CREATININE-BSD FMLA CKD-EPI: >60 MLS/MIN/1.73/M2
GLOBULIN SER-MCNC: 3.9 GM/DL (ref 2.4–3.5)
GLUCOSE SERPL-MCNC: 95 MG/DL (ref 74–100)
HCT VFR BLD AUTO: 37.6 % (ref 37–47)
HGB BLD-MCNC: 12.2 G/DL (ref 12–16)
IMM GRANULOCYTES # BLD AUTO: 0.02 X10(3)/MCL (ref 0–0.04)
IMM GRANULOCYTES NFR BLD AUTO: 0.3 %
IRON SATN MFR SERPL: 32 % (ref 20–50)
IRON SERPL-MCNC: 101 UG/DL (ref 50–170)
LYMPHOCYTES # BLD AUTO: 1.96 X10(3)/MCL (ref 0.6–4.6)
LYMPHOCYTES NFR BLD AUTO: 33.4 %
MCH RBC QN AUTO: 30.3 PG (ref 27–31)
MCHC RBC AUTO-ENTMCNC: 32.4 G/DL (ref 33–36)
MCV RBC AUTO: 93.3 FL (ref 80–94)
MONOCYTES # BLD AUTO: 0.42 X10(3)/MCL (ref 0.1–1.3)
MONOCYTES NFR BLD AUTO: 7.2 %
NEUTROPHILS # BLD AUTO: 3.38 X10(3)/MCL (ref 2.1–9.2)
NEUTROPHILS NFR BLD AUTO: 57.7 %
NRBC BLD AUTO-RTO: 0 %
PLATELET # BLD AUTO: 286 X10(3)/MCL (ref 130–400)
PMV BLD AUTO: 10.4 FL (ref 7.4–10.4)
POTASSIUM SERPL-SCNC: 3.8 MMOL/L (ref 3.5–5.1)
PROT SERPL-MCNC: 8.3 GM/DL (ref 6.4–8.3)
RBC # BLD AUTO: 4.03 X10(6)/MCL (ref 4.2–5.4)
RET# (OHS): 0.08 X10E6/UL (ref 0.02–0.08)
RETICULOCYTE COUNT AUTOMATED (OLG): 2.02 % (ref 1.1–2.1)
SODIUM SERPL-SCNC: 140 MMOL/L (ref 136–145)
TIBC SERPL-MCNC: 214 UG/DL (ref 70–310)
TIBC SERPL-MCNC: 315 UG/DL (ref 250–450)
TRANSFERRIN SERPL-MCNC: 294 MG/DL (ref 180–382)
WBC # SPEC AUTO: 5.86 X10(3)/MCL (ref 4.5–11.5)

## 2024-03-26 PROCEDURE — 80053 COMPREHEN METABOLIC PANEL: CPT | Performed by: NURSE PRACTITIONER

## 2024-03-26 PROCEDURE — 85025 COMPLETE CBC W/AUTO DIFF WBC: CPT | Performed by: NURSE PRACTITIONER

## 2024-03-26 PROCEDURE — 82306 VITAMIN D 25 HYDROXY: CPT | Performed by: NURSE PRACTITIONER

## 2024-03-26 PROCEDURE — 99214 OFFICE O/P EST MOD 30 MIN: CPT | Mod: PBBFAC | Performed by: NURSE PRACTITIONER

## 2024-03-26 PROCEDURE — 36415 COLL VENOUS BLD VENIPUNCTURE: CPT | Performed by: NURSE PRACTITIONER

## 2024-03-26 PROCEDURE — 82728 ASSAY OF FERRITIN: CPT | Performed by: NURSE PRACTITIONER

## 2024-03-26 PROCEDURE — 87536 HIV-1 QUANT&REVRSE TRNSCRPJ: CPT | Performed by: NURSE PRACTITIONER

## 2024-03-26 PROCEDURE — 85045 AUTOMATED RETICULOCYTE COUNT: CPT | Performed by: NURSE PRACTITIONER

## 2024-03-26 PROCEDURE — 86360 T CELL ABSOLUTE COUNT/RATIO: CPT | Performed by: NURSE PRACTITIONER

## 2024-03-26 PROCEDURE — 99214 OFFICE O/P EST MOD 30 MIN: CPT | Mod: S$PBB,,, | Performed by: NURSE PRACTITIONER

## 2024-03-26 PROCEDURE — 83540 ASSAY OF IRON: CPT | Performed by: NURSE PRACTITIONER

## 2024-03-26 RX ORDER — FERROUS SULFATE 325(65) MG
325 TABLET ORAL DAILY
Qty: 90 TABLET | Refills: 1 | Status: SHIPPED | OUTPATIENT
Start: 2024-03-26 | End: 2024-03-26

## 2024-03-26 RX ORDER — VIT C/E/ZN/COPPR/LUTEIN/ZEAXAN 250MG-90MG
1000 CAPSULE ORAL DAILY
Qty: 30 CAPSULE | Refills: 6 | Status: SHIPPED | OUTPATIENT
Start: 2024-03-26

## 2024-03-26 RX ORDER — BICTEGRAVIR SODIUM, EMTRICITABINE, AND TENOFOVIR ALAFENAMIDE FUMARATE 50; 200; 25 MG/1; MG/1; MG/1
1 TABLET ORAL DAILY
Qty: 30 TABLET | Refills: 5 | Status: SHIPPED | OUTPATIENT
Start: 2024-03-26

## 2024-03-26 NOTE — PROGRESS NOTES
Patient ID: Dinorah Agosto 24 y.o.     Chief Complaint:   Chief Complaint   Patient presents with    Follow-up     B20        HPI:    Dinorah Agosto is a 25 y/o AAF here for AIDS f/u.  MSF.  G6PD def, HLA  neg.  Dx with HIV @ birth, but was not told by her adoptive mother until 3.5 years ago when she became sexually active.  Previously followed in peds clinic by Dr. Mcrae. She reports adherence to Biktarvy, tolerating the meds well.  Denies fever, headache, chills, visual problems, N/V/D, SOB, cough, chest pain or abd pain. Reviewed labs from 8/11/23 HIV VL ND, CD4 660, Hep A IGG +, Hep B s ab +. Hx of iron def anemia. Pt reports adherence to iron supplement.  Pt is followed in gyn clinic by OLIVIA Pierce.  She has a Mirena.  Next appt 4/23/24.  Eye appt scheduled for 5/1/24. She is due for a COVID booster, but is not amenable.           Past Medical History:   Diagnosis Date    ADHD     Anemia, unspecified     Herpesviral vesicular dermatitis     Human immunodeficiency virus (HIV) disease     Iron deficiency anemia, unspecified     Migraine     Other seasonal allergic rhinitis     Recurrent herpes simplex     Slow learner     Vitamin D deficiency         Past Surgical History:   Procedure Laterality Date    HERNIA REPAIR          Social History     Socioeconomic History    Marital status: Single   Occupational History    Occupation:    Tobacco Use    Smoking status: Never    Smokeless tobacco: Never   Substance and Sexual Activity    Alcohol use: Yes     Alcohol/week: 5.0 standard drinks of alcohol     Types: 3 Glasses of wine, 2 Shots of liquor per week     Comment: every other weekend wine + liquor    Drug use: Never     Types: Marijuana     Comment: last use in January 2023    Sexual activity: Not Currently     Partners: Female     Birth control/protection: Other-see comments     Comment: Mirena        Family History   Adopted: Yes   Problem Relation Age of Onset    HIV Mother     HIV  Father         Review of patient's allergies indicates:  No Known Allergies     Immunization History   Administered Date(s) Administered    COVID-19, MRNA, LN-S, PF (Pfizer) (Gray Cap) 03/16/2022    COVID-19, MRNA, LN-S, PF (Pfizer) (Purple Cap) 04/08/2021, 04/29/2021, 03/16/2022    DTaP 2002, 2002, 2002, 09/18/2003, 02/19/2009    HIB 2002, 2002, 2002, 09/18/2003    HPV 9-Valent 05/01/2013, 02/17/2014, 05/13/2014    HPV Quadrivalent 05/01/2013, 02/17/2014, 05/13/2014    Hepatitis A, Pediatric/Adolescent, 2 Dose 02/19/2009, 05/27/2010    Hepatitis B, Adult 09/29/2016    Hepatitis B, Pediatric/Adolescent 2002, 2002, 09/18/2003    IPV 2002, 2002, 2002, 09/18/2003, 08/10/2006    Influenza - Intranasal 09/08/2010    Influenza - Intranasal - Trivalent 09/08/2010    Influenza - Quadrivalent 09/30/2021    Influenza - Quadrivalent - PF *Preferred* (6 months and older) 10/16/2003, 10/27/2004, 11/08/2005, 11/06/2007, 12/02/2008, 09/17/2009, 10/20/2011, 10/31/2012, 12/18/2014, 11/15/2016, 11/15/2016, 09/20/2017, 03/12/2019, 02/18/2020, 11/04/2020, 09/30/2021, 11/21/2022, 09/26/2023    Influenza - Trivalent - PF (ADULT) 10/16/2003, 10/27/2004, 11/08/2005, 11/06/2007, 12/02/2008, 09/17/2009, 10/20/2011, 10/31/2012    Influenza A (H1N1) 2009 Monovalent - IM 12/10/2009    MMR 02/07/2003, 02/19/2009    Meningococcal B, OMV 03/12/2019, 09/03/2019    Meningococcal Conjugate (MCV4P) 05/01/2013, 09/10/2018    Pneumococcal Conjugate - 13 Valent 05/27/2010, 09/03/2019    Pneumococcal Conjugate - 7 Valent 2002, 2002, 03/07/2003    Pneumococcal Polysaccharide - 23 Valent 11/04/2004, 02/18/2020    Tdap 05/01/2013, 09/26/2023    Varicella 03/07/2003, 02/19/2009        Review of Systems   Constitutional: Negative.    HENT: Negative.     Eyes: Negative.    Respiratory: Negative.     Cardiovascular: Negative.    Gastrointestinal: Negative.    Genitourinary: Negative.     Musculoskeletal: Negative.    Skin: Negative.    Neurological: Negative.    Endo/Heme/Allergies: Negative.    Psychiatric/Behavioral: Negative.     All other systems reviewed and are negative.         Objective:      /77   Pulse 82   Temp 98.1 °F (36.7 °C)   Resp 16   Ht 5' (1.524 m)   Wt 44.6 kg (98 lb 5.2 oz)   LMP  (LMP Unknown)   BMI 19.20 kg/m²      Physical Exam  Vitals reviewed.   Constitutional:       General: She is not in acute distress.     Appearance: Normal appearance.   HENT:      Head: Normocephalic.      Right Ear: External ear normal.      Left Ear: External ear normal.      Nose: Nose normal.      Mouth/Throat:      Mouth: Mucous membranes are moist.      Pharynx: Oropharynx is clear.   Eyes:      General: No scleral icterus.     Extraocular Movements: Extraocular movements intact.      Conjunctiva/sclera: Conjunctivae normal.      Pupils: Pupils are equal, round, and reactive to light.   Cardiovascular:      Rate and Rhythm: Normal rate and regular rhythm.      Pulses: Normal pulses.      Heart sounds: Normal heart sounds.   Pulmonary:      Effort: Pulmonary effort is normal. No respiratory distress.      Breath sounds: Normal breath sounds.   Abdominal:      General: Bowel sounds are normal. There is no distension.      Palpations: Abdomen is soft. There is no mass.      Tenderness: There is no abdominal tenderness. There is no right CVA tenderness or left CVA tenderness.      Hernia: No hernia is present.   Musculoskeletal:         General: No tenderness or signs of injury. Normal range of motion.      Cervical back: Normal range of motion and neck supple.      Right lower leg: No edema.      Left lower leg: No edema.   Lymphadenopathy:      Cervical: No cervical adenopathy.   Skin:     General: Skin is warm and dry.      Capillary Refill: Capillary refill takes less than 2 seconds.      Findings: No erythema or lesion.   Neurological:      General: No focal deficit present.       Mental Status: She is alert and oriented to person, place, and time. Mental status is at baseline.   Psychiatric:         Mood and Affect: Mood normal.         Behavior: Behavior normal.         Thought Content: Thought content normal.         Judgment: Judgment normal.          Labs:   Lab Results   Component Value Date    WBC 5.86 03/26/2024    HGB 12.2 03/26/2024    HCT 37.6 03/26/2024    MCV 93.3 03/26/2024     03/26/2024       CMP  Sodium Level   Date Value Ref Range Status   03/26/2024 140 136 - 145 mmol/L Final     Potassium Level   Date Value Ref Range Status   03/26/2024 3.8 3.5 - 5.1 mmol/L Final     Carbon Dioxide   Date Value Ref Range Status   03/26/2024 27 22 - 29 mmol/L Final     Blood Urea Nitrogen   Date Value Ref Range Status   03/26/2024 10.1 7.0 - 18.7 mg/dL Final     Creatinine   Date Value Ref Range Status   03/26/2024 0.83 0.55 - 1.02 mg/dL Final     Calcium Level Total   Date Value Ref Range Status   03/26/2024 10.4 (H) 8.4 - 10.2 mg/dL Final     Albumin Level   Date Value Ref Range Status   03/26/2024 4.4 3.5 - 5.0 g/dL Final     Bilirubin Total   Date Value Ref Range Status   03/26/2024 0.4 <=1.5 mg/dL Final     Alkaline Phosphatase   Date Value Ref Range Status   03/26/2024 48 40 - 150 unit/L Final     Aspartate Aminotransferase   Date Value Ref Range Status   03/26/2024 51 (H) 5 - 34 unit/L Final     Alanine Aminotransferase   Date Value Ref Range Status   03/26/2024 20 0 - 55 unit/L Final     eGFR   Date Value Ref Range Status   03/26/2024 >60 mls/min/1.73/m2 Final     Lab Results   Component Value Date    TSH 0.938 08/11/2023     Hep C Ab Interp   Date Value Ref Range Status   08/11/2023 Nonreactive Nonreactive Final     Syphilis Antibody   Date Value Ref Range Status   08/11/2023 Nonreactive Nonreactive, Equivocal Final     Cholesterol Total   Date Value Ref Range Status   08/11/2023 125 <=200 mg/dL Final     HDL Cholesterol   Date Value Ref Range Status   08/11/2023 37 35 - 60  mg/dL Final     Triglyceride   Date Value Ref Range Status   08/11/2023 40 37 - 140 mg/dL Final     Cholesterol/HDL Ratio   Date Value Ref Range Status   08/11/2023 3 0 - 5 Final     Very Low Density Lipoprotein   Date Value Ref Range Status   08/11/2023 8  Final     LDL Cholesterol   Date Value Ref Range Status   08/11/2023 80.00 50.00 - 140.00 mg/dL Final     Vit D 25 OH   Date Value Ref Range Status   03/26/2024 24.1 (L) 30.0 - 80.0 ng/mL Final     Results for orders placed or performed in visit on 08/11/23   CD4 Lymphocytes   Result Value Ref Range    Patient Age >18     WBC Absolute 5,950 4,500 - 11,500 /mm3    Lymph Percent 25 (L) 28 - 48 %    Lymph Absolute 1,487.5 1,260 - 5,520 x10(3)/mcL    CD4 % 44.42 %    CD4 Absolute 660.7475 589 - 1,505 unit/L    T Cell Interp       Normal absolute lymphocyte count with normal absolute CD4+ lymphocyte count.    Ion Pelayo M.D.       Narrative    This test was developed and its performance characteristics determined by [facility]. It has not been cleared or approved by the US Food and Drug Administration. The FDA does not require this test to go through premarket FDA review. This test is used for clinical purposes. It should not be regarded as investigational or for research. This laboratory is certified under the Clinical Laboratory Improvement Amendments (CLIA) as qualified to perform high complexity clinical laboratory testing.     Results for orders placed or performed in visit on 08/11/23   HIV-1 RNA, Quantitative, PCR with Reflex to Genotype   Result Value Ref Range    HIV-1 RNA Detect/Quant, P Undetected Undetected copies/mL     Results for orders placed or performed in visit on 08/11/23   Quantiferon Gold TB   Result Value Ref Range    QuantiFERON-Tb Gold Plus Result Negative Negative    TB1 Ag minus Nil Result 0.00 IU/mL    TB2 Ag minus Nil Result 0.01 IU/mL    Mitogen minus Nil Result 9.99 IU/mL    Nil Result 0.01 IU/mL      No results found for this or any previous visit.  Results for orders placed or performed in visit on 08/11/23   Urinalysis   Result Value Ref Range    Color, UA Light-Yellow Yellow, Light-Yellow, Dark Yellow, Marcelle, Straw    Appearance, UA Clear Clear    Specific Gravity, UA 1.019     pH, UA 7.0 5.0 - 8.5    Protein, UA Negative Negative    Glucose, UA Normal Negative, Normal    Ketones, UA Negative Negative    Blood, UA Negative Negative    Bilirubin, UA Negative Negative    Urobilinogen, UA Normal 0.2, 1.0, Normal    Nitrites, UA Negative Negative    Leukocyte Esterase, UA Negative Negative    WBC, UA 0-5 None Seen, 0-2, 3-5, 0-5 /HPF    Bacteria, UA None Seen None Seen /HPF    Squamous Epithelial Cells, UA Moderate (A) None Seen /HPF    Mucous, UA Trace (A) None Seen /LPF    Hyaline Casts, UA None Seen None Seen /lpf    RBC, UA 0-5 None Seen, 0-2, 3-5, 0-5 /HPF       Imaging: Reviewed most recent relevant imaging studies available, notable results highlighted in this note    Medications:     Current Outpatient Medications   Medication Instructions    fpbchimym-bxkaijul-cexvtwk ala (BIKTARVY) -25 mg (25 kg or greater) 1 tablet, Oral, Daily    cetirizine (ZYRTEC) 10 mg, Oral, Daily PRN    cholecalciferol, vitamin D3, (VITAMIN D3) 50 mcg (2,000 unit) Cap capsule 400 Int'l Units, Oral, Daily    ferrous sulfate (FEOSOL) 325 mg, Oral, Daily    sumatriptan (IMITREX) 25 mg, Oral, As needed (PRN)       Assessment:       Problem List Items Addressed This Visit    None  Visit Diagnoses       AIDS    -  Primary    Relevant Medications    rpkbvlwls-kgiipnko-rjknizv ala (BIKTARVY) -25 mg (25 kg or greater)    Other Relevant Orders    CBC Auto Differential (Completed)    Comprehensive Metabolic Panel (Completed)    Vitamin D (Completed)    HIV-1 RNA, Quantitative, PCR with Reflex to Genotype    CD4 T-[place] Cells    G6PD deficiency        Iron deficiency anemia, unspecified iron deficiency anemia type         Relevant Medications    ferrous sulfate (FEOSOL) 325 mg (65 mg iron) Tab tablet    Other Relevant Orders    Iron and TIBC (Completed)    Reticulocytes (Completed)    Ferritin (Completed)               Plan:      AIDS  -     bwxhootxf-dulnmave-fdwvecv ala (BIKTARVY) -25 mg (25 kg or greater); Take 1 tablet by mouth once daily.  Dispense: 30 tablet; Refill: 5  -     CBC Auto Differential; Future; Expected date: 03/26/2024  -     Comprehensive Metabolic Panel; Future; Expected date: 03/26/2024  -     Vitamin D; Future; Expected date: 03/26/2024  -     HIV-1 RNA, Quantitative, PCR with Reflex to Genotype; Future; Expected date: 03/26/2024  -     CD4 T-[place] Cells; Future; Expected date: 03/26/2024  Adherence and sexual health counseling done.  Use condoms for all sexual encounters.  Blood precautions.  Continue Biktarvy 1 po q day as prescribed.  Labs today  RTC 6 months with Kayley      Wellness:  Cervical pap:11/2022. Gyn appt scheduled for 4/23/24  Eye exam: scheduled for 5/1/24  COVID booster recommended     G6PD deficiency  Avoid andrez beans, ASA, and Sulfonamides (Dapsone, Macrobid, Primaquine, etc).     Iron deficiency anemia, unspecified iron deficiency anemia type  -     ferrous sulfate (FEOSOL) 325 mg (65 mg iron) Tab tablet; Take 1 tablet (325 mg total) by mouth once daily.  Dispense: 90 tablet; Refill: 1  -     Iron and TIBC; Future; Expected date: 03/26/2024  -     Reticulocytes; Future; Expected date: 03/26/2024  -     Ferritin; Future; Expected date: 03/26/2024  Continue iron supplement   Pt will need updated labs today

## 2024-03-26 NOTE — TELEPHONE ENCOUNTER
----- Message from OLIVIA Patterson sent at 3/26/2024  3:32 PM CDT -----  Vit D 24.1. Pt will need to start Vit D 1000 units per day. Anemia has improved and iron levels are normal. She can discontinue the iron supplement.  Please notify patient.

## 2024-03-26 NOTE — PROGRESS NOTES
Vit D 24.1. Pt will need to start Vit D 1000 units per day. Anemia has improved and iron levels are normal. She can discontinue the iron supplement.  Please notify patient.

## 2024-03-27 LAB
CD3 CELLS # BLD: 1184 CELLS/MCL (ref 550–2202)
CD3 CELLS NFR BLD: 79 % (ref 58–86)
CD3+CD4+ CELLS # BLD: 665 CELLS/MCL (ref 365–1437)
CD3+CD4+ CELLS NFR BLD: 44 % (ref 32–64)
CD3+CD4+ CELLS/CD3+CD8+ CLL BLD: 1.4 %
CD3+CD8+ CELLS # BLD: 460 CELLS/MCL (ref 199–846)
CD3+CD8+ CELLS NFR BLD: 31 % (ref 18–40)
CD45 CELLS # BLD: 1.51 THOU/MCL (ref 0.82–2.84)

## 2024-03-28 LAB — HIV1 RNA # PLAS NAA DL=20: NORMAL COPIES/ML

## 2024-04-30 ENCOUNTER — OFFICE VISIT (OUTPATIENT)
Dept: GYNECOLOGY | Facility: CLINIC | Age: 24
End: 2024-04-30

## 2024-04-30 VITALS
TEMPERATURE: 98 F | HEIGHT: 60 IN | OXYGEN SATURATION: 100 % | HEART RATE: 80 BPM | DIASTOLIC BLOOD PRESSURE: 85 MMHG | SYSTOLIC BLOOD PRESSURE: 119 MMHG | BODY MASS INDEX: 19.01 KG/M2 | WEIGHT: 96.81 LBS | RESPIRATION RATE: 20 BRPM

## 2024-04-30 DIAGNOSIS — Z30.431 INTRAUTERINE DEVICE SURVEILLANCE: ICD-10-CM

## 2024-04-30 DIAGNOSIS — Z12.4 PAP SMEAR FOR CERVICAL CANCER SCREENING: Primary | ICD-10-CM

## 2024-04-30 PROCEDURE — 87491 CHLMYD TRACH DNA AMP PROBE: CPT | Performed by: NURSE PRACTITIONER

## 2024-04-30 PROCEDURE — 88174 CYTOPATH C/V AUTO IN FLUID: CPT | Performed by: NURSE PRACTITIONER

## 2024-04-30 PROCEDURE — 99395 PREV VISIT EST AGE 18-39: CPT | Mod: S$PBB,,, | Performed by: NURSE PRACTITIONER

## 2024-04-30 PROCEDURE — 87624 HPV HI-RISK TYP POOLED RSLT: CPT | Performed by: NURSE PRACTITIONER

## 2024-04-30 PROCEDURE — 87491 CHLMYD TRACH DNA AMP PROBE: CPT

## 2024-04-30 PROCEDURE — 87624 HPV HI-RISK TYP POOLED RSLT: CPT

## 2024-04-30 PROCEDURE — 87591 N.GONORRHOEAE DNA AMP PROB: CPT

## 2024-04-30 PROCEDURE — 99214 OFFICE O/P EST MOD 30 MIN: CPT | Mod: PBBFAC | Performed by: NURSE PRACTITIONER

## 2024-04-30 PROCEDURE — 87591 N.GONORRHOEAE DNA AMP PROB: CPT | Performed by: NURSE PRACTITIONER

## 2024-04-30 NOTE — PROGRESS NOTES
[facility] -  Gynecology / Women's Health Clinic      Subjective:       Patient ID: Dinorah Agosto is a 24 y.o. female.    Chief Complaint:  Gynecologic Exam      History of Present Illness  The patient G0 here for annual exam. Pt had Mirena IUD placed in  for contraception. She has monthly cycles lasting 5 days and change pads 2x/day and wishes to continue. Denies history of abnormal paps. Last pap -NIL and HPV neg.  Denies breast or urinary complaints. Denies pelvic pain, abnormal bleeding or discharge. Pt reports HIV which she is followed by ID clinic, no concerns for STIs today. HPV vaccinated. Denies tobacco use. Dep. screening 0. Denies fly hx of breast, ovarian, uterine or colon cancer.     GYN & OB History  Patient's last menstrual period was 2024 (exact date).   Date of Last Pap: 2022    Review of patient's allergies indicates:  No Known Allergies  Past Medical History:   Diagnosis Date    ADHD     Anemia, unspecified     Herpesviral vesicular dermatitis     Human immunodeficiency virus (HIV) disease     Iron deficiency anemia, unspecified     Migraine     Other seasonal allergic rhinitis     Recurrent herpes simplex     Slow learner     Vitamin D deficiency      OB History    Para Term  AB Living   0 0 0 0 0 0   SAB IAB Ectopic Multiple Live Births   0 0 0 0 0        Review of Systems  Review of Systems    Negative except for pertinent findings for positives per HPI     Objective:    Physical Exam    /85 (BP Location: Right arm, Patient Position: Sitting, BP Method: Medium (Automatic))   Pulse 80   Temp 98.3 °F (36.8 °C) (Oral)   Resp 20   Ht 5' (1.524 m)   Wt 43.9 kg (96 lb 12.5 oz)   LMP 2024 (Exact Date)   SpO2 100%   BMI 18.90 kg/m²   GENERAL: Well-developed female. No acute distress.    SKIN: Normal to inspection, warm and intact.  BREASTS: No rashes or erythema. No masses, lumps, discharge, tenderness.  VULVA: General  appearance normal; external genitalia with no lesions or erythema.  VAGINA: Mucosa/vaginal vault pink, no abnormal discharge or lesions.  CERVIX: Pink, nulliparous appearing os, strings visible from os, no erythema or abnormal discharge.  BIMANUAL EXAM: reveals a 6 week-sized uterus. The uterus is non tender. Rakan adnexa reveal no tenderness.  PSYCHIATRIC: Patient is oriented to person, place, and time. Mood and affect are normal.    Assessment:         ICD-10-CM ICD-9-CM   1. Pap smear for cervical cancer screening  Z12.4 V76.2   2. Intrauterine device surveillance  Z30.431 V25.42     Plan:   Dinorah was seen today for gynecologic exam.    Diagnoses and all orders for this visit:    Pap smear for cervical cancer screening  -     Liquid-Based Pap Smear, Screening Screening    Intrauterine device surveillance       Pap today, Per GYN note in 2022. Pap x yearly x 3 years, if normal okay to continue with cytology every 3 years.   IUD strings visible from os  Follow up in about 1 year (around 4/30/2025) for annual exam.

## 2024-05-02 LAB
CHLAMYDIA TRACHOMATIS: NEGATIVE
HIGH RISK HPV: NEGATIVE
NEISSERIA GONORRHOEAE: NEGATIVE
PSYCHE PATHOLOGY RESULT: NORMAL

## 2024-05-08 ENCOUNTER — OFFICE VISIT (OUTPATIENT)
Dept: INTERNAL MEDICINE | Facility: CLINIC | Age: 24
End: 2024-05-08

## 2024-05-08 VITALS
HEIGHT: 60 IN | TEMPERATURE: 99 F | HEART RATE: 100 BPM | RESPIRATION RATE: 20 BRPM | DIASTOLIC BLOOD PRESSURE: 73 MMHG | BODY MASS INDEX: 18.91 KG/M2 | SYSTOLIC BLOOD PRESSURE: 109 MMHG | OXYGEN SATURATION: 98 % | WEIGHT: 96.31 LBS

## 2024-05-08 DIAGNOSIS — Z13.1 SCREENING FOR DIABETES MELLITUS: ICD-10-CM

## 2024-05-08 DIAGNOSIS — F90.2 ATTENTION DEFICIT HYPERACTIVITY DISORDER (ADHD), COMBINED TYPE: ICD-10-CM

## 2024-05-08 DIAGNOSIS — Z00.00 WELL ADULT EXAM: Primary | ICD-10-CM

## 2024-05-08 DIAGNOSIS — Z13.220 SCREENING FOR LIPID DISORDERS: ICD-10-CM

## 2024-05-08 DIAGNOSIS — D50.0 IRON DEFICIENCY ANEMIA DUE TO CHRONIC BLOOD LOSS: ICD-10-CM

## 2024-05-08 DIAGNOSIS — E55.9 VITAMIN D DEFICIENCY: ICD-10-CM

## 2024-05-08 PROCEDURE — 99213 OFFICE O/P EST LOW 20 MIN: CPT | Mod: S$PBB,25,,

## 2024-05-08 PROCEDURE — 99215 OFFICE O/P EST HI 40 MIN: CPT | Mod: PBBFAC

## 2024-05-08 PROCEDURE — 99395 PREV VISIT EST AGE 18-39: CPT | Mod: S$PBB,,,

## 2024-05-08 NOTE — PROGRESS NOTES
PATIENT NAME: Dinorah Agosto  : 2000  DATE: 24  MRN: [id number]          Reason for Visit/Chief Complaint   Annual Exam and Inattention       History of Present Illness (HPI)     Dinorah Agosto is a 24 y.o. Black female presenting in clinic today for an Annual Exam and Inattention. PMH: HIV (acquired at birth), ADHD, herpes simplex, vitamin D deficiency, menorrhagia (Mirena insertion 2022), migraines (controlled with imitrex), seasonal allergies. She is followed by [facility] clinic. She voices compliance with Biktarvy. She was adopted as a , it was an open adoption. Her biological father passed away of HIV. Her biological mother is still living.     Patient states she is currently in school and is having test anxiety and difficulty focusing. She was dx'd with ADHD as a child, but has never treatment.     Denies smoking or illicit drug use. She drinks wine and/or liquor every other weekend. Denies chest pain, shortness of breath, cough, headache, dizziness, weakness, abdominal pain, nausea, vomiting, diarrhea, constipation, dysuria, depression, anxiety, SI, and HI.     Cervical Cancer Screening - Last Pap on 2024. Follow up with GYN Clinic annually.   Breast Cancer Screening - Deferred due to age.   Colon Cancer Screening - Deferred due to age.   Osteoporosis Screening - Deferred due to age.   Eye Exam - [facility]-2022.  Dental Exam - Several years. List of local dentists given to patient.  Vaccinations: Flu - 2023 / Tetanus - 2023 / Covid - 2021, 2021, 3/16/2022    Review of Systems     Review of Systems   Constitutional: Negative.    HENT: Negative.     Eyes: Negative.    Respiratory: Negative.     Cardiovascular: Negative.    Gastrointestinal: Negative.    Endocrine: Negative.    Genitourinary: Negative.    Musculoskeletal: Negative.    Skin: Negative.    Allergic/Immunologic: Negative.    Neurological: Negative.    Hematological: Negative.     Psychiatric/Behavioral:  Positive for decreased concentration.    All other systems reviewed and are negative.      Medical / Social / Family History     Past Medical History:   Diagnosis Date    ADHD     Anemia, unspecified     Herpesviral vesicular dermatitis     Human immunodeficiency virus (HIV) disease     Iron deficiency anemia, unspecified     Migraine     Other seasonal allergic rhinitis     Recurrent herpes simplex     Slow learner     Vitamin D deficiency          Past Surgical History:   Procedure Laterality Date    HERNIA REPAIR           Social History  Dinorah Agosto's  reports that she has never smoked. She has never been exposed to tobacco smoke. She has never used smokeless tobacco. She reports current alcohol use of about 5.0 standard drinks of alcohol per week. She reports current drug use. Drug: Marijuana.    Family History  Dinorah Agosto's family history includes HIV in her father and mother. She was adopted.    Medications and Allergies     Medications  Current Outpatient Medications   Medication Instructions    tuyxawrfq-wnsmyvta-aunxfvk ala (BIKTARVY) -25 mg (25 kg or greater) 1 tablet, Oral, Daily    cetirizine (ZYRTEC) 10 mg, Oral, Daily PRN    cholecalciferol (vitamin D3) (VITAMIN D3) 1,000 Units, Oral, Daily    sumatriptan (IMITREX) 25 mg, Oral, As needed (PRN)       Allergies  Review of patient's allergies indicates:  No Known Allergies    Physical Examination   Visit Vitals  /73 (BP Location: Right arm, Patient Position: Sitting, BP Method: Small (Automatic))   Pulse 100   Temp 98.5 °F (36.9 °C) (Oral)   Resp 20   Ht 5' (1.524 m)   Wt 43.7 kg (96 lb 5.5 oz)   LMP 04/18/2024 (Exact Date)   SpO2 98%   BMI 18.82 kg/m²     Physical Exam  Vitals reviewed.   Constitutional:       Appearance: Normal appearance. She is normal weight.   HENT:      Head: Normocephalic and atraumatic.      Right Ear: External ear normal.      Left Ear: External ear normal.      Nose: Nose  normal.      Mouth/Throat:      Mouth: Mucous membranes are moist.      Pharynx: Oropharynx is clear.   Eyes:      Extraocular Movements: Extraocular movements intact.      Conjunctiva/sclera: Conjunctivae normal.      Pupils: Pupils are equal, round, and reactive to light.   Cardiovascular:      Rate and Rhythm: Normal rate and regular rhythm.      Pulses: Normal pulses.      Heart sounds: Normal heart sounds.   Pulmonary:      Effort: Pulmonary effort is normal.      Breath sounds: Normal breath sounds.   Abdominal:      General: Bowel sounds are normal.      Palpations: Abdomen is soft.   Musculoskeletal:         General: Normal range of motion.      Cervical back: Normal range of motion and neck supple.   Skin:     General: Skin is warm and dry.      Capillary Refill: Capillary refill takes less than 2 seconds.   Neurological:      General: No focal deficit present.      Mental Status: She is alert and oriented to person, place, and time.   Psychiatric:         Mood and Affect: Mood normal.         Behavior: Behavior is hyperactive.         Thought Content: Thought content normal.         Judgment: Judgment normal.           Results     Lab Results   Component Value Date    WBC 5.86 03/26/2024    RBC 4.03 (L) 03/26/2024    HGB 12.2 03/26/2024    HCT 37.6 03/26/2024    MCV 93.3 03/26/2024    MCH 30.3 03/26/2024    MCHC 32.4 (L) 03/26/2024    RDW 12.4 03/26/2024     03/26/2024    MPV 10.4 03/26/2024      Lab Results   Component Value Date     03/26/2024    K 3.8 03/26/2024    CHLORIDE 105 03/26/2024    CO2 27 03/26/2024    GLUCOSE 95 03/26/2024    BUN 10.1 03/26/2024    CREATININE 0.83 03/26/2024    LABPROT 8.3 03/26/2024    ALBUMIN 4.4 03/26/2024    BILITOT 0.4 03/26/2024    ALKPHOS 48 03/26/2024    AST 51 (H) 03/26/2024    ALT 20 03/26/2024    EGFRNORACEVR >60 03/26/2024     Lab Results   Component Value Date    TSH 0.938 08/11/2023     Lab Results   Component Value Date    CHOL 125 08/11/2023     "HDL 37 08/11/2023    LDL 80.00 08/11/2023    TRIG 40 08/11/2023     Lab Results   Component Value Date    COLORUA Light-Yellow 08/11/2023    SGUA 1.019 08/11/2023    PHURINE 7.0 08/11/2023    PROTEINUA Negative 08/11/2023    GLUCOSEUA Normal 08/11/2023    BILIRUBINUA Negative 08/11/2023    BLOODUA Negative 08/11/2023    WBCUA 0-5 08/11/2023    RBCUA 0-5 08/11/2023    BACTERIA None Seen 08/11/2023    NITRITESUA Negative 08/11/2023    LEUKOCYTESUR Negative 08/11/2023    UROBILINOGEN Normal 08/11/2023     Lab Results   Component Value Date    CREATRANDUR 195.9 (H) 12/30/2022    MICALBCREAT 4.6 12/30/2022     Lab Results   Component Value Date    OWSQNDIL77DZ 24.1 (L) 03/26/2024     Lab Results   Component Value Date    HIV Reactive (A) 08/11/2023    HEPAIGM Nonreactive 08/11/2023    HEPBCOREM Nonreactive 08/11/2023    HEPCAB Nonreactive 08/11/2023     No results found for: "FITDIAG", "COLOGUARD"  No results found for: "OCCBLDIA"    Assessment and Plan (including Health Maintenance)     Problem List Items Addressed This Visit          Psychiatric    Attention deficit hyperactivity disorder (ADHD), combined type    Current Assessment & Plan     Patient report currently being in school and having difficulty with focusing. She denies being on medication as a child.   Referral to JENNIFER Gu to eval and treat.           Relevant Orders    Ambulatory referral/consult to Behavioral Health       Oncology    Iron deficiency anemia due to chronic blood loss    Relevant Orders    Iron and TIBC    Ferritin       Endocrine    BMI less than 19,adult    Current Assessment & Plan     Body mass index is 18.82 kg/m².          Relevant Orders    TSH    T4, Free       Other    Well adult exam - Primary    Current Assessment & Plan     Wellness labs - 3/26/2024.   Cervical Cancer Screening - Last Pap on 4/30/2024. Follow up with GYN Clinic annually.   Breast Cancer Screening - Deferred due to age.   Colon Cancer Screening - " Deferred due to age.   Osteoporosis Screening - Deferred due to age.   Eye Exam - [facility]-June/July 2022.  Dental Exam - Several years. List of local dentists given to patient.  Vaccinations: Flu - 9/26/2023 / Tetanus - 9/26/2023 / Covid - 4/8/2021, 4/29/2021, 3/16/2022         Relevant Orders    Urinalysis, Reflex to Urine Culture    Microalbumin/Creatinine Ratio, Urine    Comprehensive Metabolic Panel    CBC Auto Differential     Other Visit Diagnoses       Vitamin D deficiency        Relevant Orders    Vitamin D    Screening for lipid disorders        Relevant Orders    Lipid Panel    Screening for diabetes mellitus        Relevant Orders    Hemoglobin A1C             There are no preventive care reminders to display for this patient.  All of your core healthy metrics are met.      Health Maintenance Topics with due status: Not Due       Topic Last Completion Date    Pneumococcal Vaccines (Age 0-64) 02/18/2020    TETANUS VACCINE 09/26/2023    Chlamydia Screening 04/30/2024    Pap Smear 04/30/2024       Future Appointments   Date Time Provider Department Center   6/17/2024 12:00 PM [facility]   7/26/2024  2:00 PM Kayley Bello FNP [facility] [place] [facility]   5/1/2025  9:50 AM Kady Bowden, ANP [facility]   5/8/2025  1:40 PM Rama Rocha FNP [facility]        Follow up in about 1 year (around 5/8/2025) for F2F, Follow up, Med check, Lab review, Wellness, RTC PRN.          Signature:        OLIVIA Sosa  [facility]  [address] [facility] [id number]    Date of encounter: 5/8/24

## 2024-05-09 NOTE — ASSESSMENT & PLAN NOTE
Wellness labs - 3/26/2024.   Cervical Cancer Screening - Last Pap on 4/30/2024. Follow up with GYN Clinic annually.   Breast Cancer Screening - Deferred due to age.   Colon Cancer Screening - Deferred due to age.   Osteoporosis Screening - Deferred due to age.   Eye Exam - [facility]-June/July 2022.  Dental Exam - Several years. List of local dentists given to patient.  Vaccinations: Flu - 9/26/2023 / Tetanus - 9/26/2023 / Covid - 4/8/2021, 4/29/2021, 3/16/2022

## 2024-05-09 NOTE — ASSESSMENT & PLAN NOTE
Patient report currently being in school and having difficulty with focusing. She denies being on medication as a child.   Referral to JENNIFER Gu to eval and treat.

## 2024-05-10 ENCOUNTER — TELEPHONE (OUTPATIENT)
Dept: INTERNAL MEDICINE | Facility: CLINIC | Age: 24
End: 2024-05-10

## 2024-06-03 ENCOUNTER — TELEPHONE (OUTPATIENT)
Dept: INTERNAL MEDICINE | Facility: CLINIC | Age: 24
End: 2024-06-03

## 2024-06-03 NOTE — TELEPHONE ENCOUNTER
----- Message from Rosangela Ceballos sent at 6/3/2024  9:46 AM CDT -----  Regarding: call back  Who Called: Dinorah Agosto    Patient is returning phone call    Who Left Message for Patient:office staff  Does the patient know what this is regarding?:Dinorah is requesting a call back in regards to questions regarding a weight gain medication      Preferred Method of Contact: Phone Call  Patient's Preferred Phone Number on File: [phone]   Best Call Back Number, if different:  Additional Information:

## 2024-06-03 NOTE — TELEPHONE ENCOUNTER
Called pt and she stated she is a fighter and has tried for years to keep weight, she has increased her carb intake but can't seem to keep weight on. Stated her  advised asking her provider if there is a weight gain medication she can take. Please advise.

## 2024-06-05 NOTE — TELEPHONE ENCOUNTER
Called pt and informed her of providers message below. Pt verbalized understanding with no further questions or concerns at this time.

## 2024-07-24 ENCOUNTER — TELEPHONE (OUTPATIENT)
Dept: INTERNAL MEDICINE | Facility: CLINIC | Age: 24
End: 2024-07-24

## 2024-07-24 ENCOUNTER — LAB VISIT (OUTPATIENT)
Dept: LAB | Facility: HOSPITAL | Age: 24
End: 2024-07-24

## 2024-07-24 DIAGNOSIS — B20 HIV INFECTION, UNSPECIFIED SYMPTOM STATUS: ICD-10-CM

## 2024-07-24 DIAGNOSIS — Z11.59 ENCOUNTER FOR SCREENING FOR VIRAL DISEASE: Primary | ICD-10-CM

## 2024-07-24 DIAGNOSIS — Z11.59 ENCOUNTER FOR SCREENING FOR VIRAL DISEASE: ICD-10-CM

## 2024-07-24 LAB
HAV IGM SERPL QL IA: NONREACTIVE
HBV CORE IGM SERPL QL IA: NONREACTIVE
HBV SURFACE AG SERPL QL IA: NONREACTIVE
HCV AB SERPL QL IA: NONREACTIVE

## 2024-07-24 PROCEDURE — 36415 COLL VENOUS BLD VENIPUNCTURE: CPT

## 2024-07-24 PROCEDURE — 86359 T CELLS TOTAL COUNT: CPT

## 2024-07-24 PROCEDURE — 80074 ACUTE HEPATITIS PANEL: CPT

## 2024-07-24 NOTE — TELEPHONE ENCOUNTER
----- Message from Mela Beckham sent at 7/24/2024 10:09 AM CDT -----  Regarding: call back  .Who Called: Dinorah Agosto    Caller is requesting assistance/information from provider's office.    Symptoms (please be specific):    How long has patient had these symptoms:    List of preferred pharmacies on file (remove unneeded): [unfilled]  If different, enter pharmacy into here including location and phone number:       Preferred Method of Contact: Phone Call  Patient's Preferred Phone Number on File: [phone]   Best Call Back Number, if different:  Additional Information: pt requesting a call back with questions about results

## 2024-07-24 NOTE — TELEPHONE ENCOUNTER
I spoke with patient and she is requesting a Hep panel screening. New order placed as per PCP, patient notified of new order, noted.

## 2024-07-25 LAB
CD3 CELLS # BLD: 2271 CELLS/MCL (ref 550–2202)
CD3 CELLS NFR BLD: 81 % (ref 58–86)
CD3+CD4+ CELLS # BLD: 1281 CELLS/MCL (ref 365–1437)
CD3+CD4+ CELLS NFR BLD: 46 % (ref 32–64)
CD3+CD4+ CELLS/CD3+CD8+ CLL BLD: 1.4 %
CD3+CD8+ CELLS # BLD: 890 CELLS/MCL (ref 199–846)
CD3+CD8+ CELLS NFR BLD: 32 % (ref 18–40)
CD45 CELLS # BLD: 2.79 THOU/MCL (ref 0.82–2.84)

## 2024-08-12 PROBLEM — Z00.00 WELL ADULT EXAM: Status: RESOLVED | Noted: 2023-01-11 | Resolved: 2024-08-12

## 2024-11-18 ENCOUNTER — PATIENT MESSAGE (OUTPATIENT)
Dept: INFECTIOUS DISEASES | Facility: CLINIC | Age: 24
End: 2024-11-18

## 2024-11-19 ENCOUNTER — OFFICE VISIT (OUTPATIENT)
Dept: INFECTIOUS DISEASES | Facility: CLINIC | Age: 24
End: 2024-11-19

## 2024-11-19 ENCOUNTER — PATIENT MESSAGE (OUTPATIENT)
Dept: INFECTIOUS DISEASES | Facility: CLINIC | Age: 24
End: 2024-11-19

## 2024-11-19 DIAGNOSIS — E55.9 VITAMIN D DEFICIENCY: ICD-10-CM

## 2024-11-19 DIAGNOSIS — D75.A G6PD DEFICIENCY: ICD-10-CM

## 2024-11-19 DIAGNOSIS — B20 HIV DISEASE: Primary | ICD-10-CM

## 2024-11-19 DIAGNOSIS — Z11.3 ROUTINE SCREENING FOR STI (SEXUALLY TRANSMITTED INFECTION): ICD-10-CM

## 2024-11-19 RX ORDER — BICTEGRAVIR SODIUM, EMTRICITABINE, AND TENOFOVIR ALAFENAMIDE FUMARATE 50; 200; 25 MG/1; MG/1; MG/1
1 TABLET ORAL DAILY
Qty: 30 TABLET | Refills: 0 | Status: SHIPPED | OUTPATIENT
Start: 2024-11-19

## 2024-11-19 NOTE — PROGRESS NOTES
Patient ID: Dinorah Agosto 24 y.o.     Chief Complaint:   Chief Complaint   Patient presents with    Follow-up     B20        HPI:    The patient location is: [place], [place]   The chief complaint leading to consultation is: HIV f/u    Visit type: audiovisual    Face to Face time with patient:   30 minutes of total time spent on the encounter, which includes face to face time and non-face to face time preparing to see the patient (eg, review of tests), Obtaining and/or reviewing separately obtained history, Documenting clinical information in the electronic or other health record, Independently interpreting results (not separately reported) and communicating results to the patient/family/caregiver, or Care coordination (not separately reported).         Each patient to whom he or she provides medical services by telemedicine is:  (1) informed of the relationship between the physician and patient and the respective role of any other health care provider with respect to management of the patient; and (2) notified that he or she may decline to receive medical services by telemedicine and may withdraw from such care at any time.    Notes:   Dinorah Agosto is a 25 y/o AAF via telemed for AIDS f/u. [facility].  G6PD def, HLA  neg.  Dx with HIV @ birth, but was not told by her adoptive mother until 3.5 years ago when she became sexually active.  Previously followed in peds clinic by Dr. Mcrae. Pt endorses 100% adherence to Biktarvy, tolerating the meds well.  Denies fever, headache, chills, visual problems, N/V/D, SOB, cough, chest pain or abd pain. Reviewed labs from 3/26/24 HIV VL ND, Vit D 24.1.  7/24/24 CD4 1281. Labs will need to be updated.  Last cervical pap smear was 4/30/24. Pt is due for an eye exam. She states she missed her last appt due to her insurance.  I will send a referral.  She is due for a COVID booster, but is not amenable.           Past Medical History:   Diagnosis Date    ADHD     Anemia, unspecified      Herpesviral vesicular dermatitis     Human immunodeficiency virus (HIV) disease     Iron deficiency anemia, unspecified     Migraine     Other seasonal allergic rhinitis     Recurrent herpes simplex     Slow learner     Vitamin D deficiency         Past Surgical History:   Procedure Laterality Date    HERNIA REPAIR          Social History     Socioeconomic History    Marital status: Single   Occupational History    Occupation:    Tobacco Use    Smoking status: Never     Passive exposure: Never    Smokeless tobacco: Never   Substance and Sexual Activity    Alcohol use: Yes     Alcohol/week: 5.0 standard drinks of alcohol     Types: 3 Glasses of wine, 2 Shots of liquor per week     Comment: every other weekend wine + liquor    Drug use: Yes     Types: Marijuana     Comment: last use in January 2023    Sexual activity: Not Currently     Partners: Female     Birth control/protection: Other-see comments     Comment: Mirena     Social Drivers of Health     Financial Resource Strain: Low Risk  (5/8/2024)    Overall Financial Resource Strain (CARDIA)     Difficulty of Paying Living Expenses: Not hard at all   Food Insecurity: No Food Insecurity (5/8/2024)    Hunger Vital Sign     Worried About Running Out of Food in the Last Year: Never true     Ran Out of Food in the Last Year: Never true   Transportation Needs: No Transportation Needs (5/8/2024)    TRANSPORTATION NEEDS     Transportation : No   Physical Activity: Sufficiently Active (5/8/2024)    Exercise Vital Sign     Days of Exercise per Week: 4 days     Minutes of Exercise per Session: 40 min   Stress: Stress Concern Present (5/8/2024)    [employer] - Occupational Stress Questionnaire     Feeling of Stress : To some extent   Housing Stability: Low Risk  (5/8/2024)    Housing Stability Vital Sign     Unable to Pay for Housing in the Last Year: No     Homeless in the Last Year: No        Family History   Adopted: Yes    Problem Relation Name Age of Onset    HIV Mother      HIV Father          Review of patient's allergies indicates:  No Known Allergies     Immunization History   Administered Date(s) Administered    COVID-19, MRNA, LN-S, PF (Pfizer) (Gray Cap) 03/16/2022    COVID-19, MRNA, LN-S, PF (Pfizer) (Purple Cap) 04/08/2021, 04/29/2021, 03/16/2022    DTaP 2002, 2002, 2002, 09/18/2003, 02/19/2009    HIB 2002, 2002, 2002, 09/18/2003    HPV 9-Valent 05/01/2013, 02/17/2014, 05/13/2014    HPV Quadrivalent 05/01/2013, 02/17/2014, 05/13/2014    Hepatitis A, Pediatric/Adolescent, 2 Dose 02/19/2009, 05/27/2010    Hepatitis B, Adult 09/29/2016    Hepatitis B, Pediatric/Adolescent 2002, 2002, 09/18/2003    IPV 2002, 2002, 2002, 09/18/2003, 08/10/2006    Influenza - Intranasal 09/08/2010    Influenza - Intranasal - Trivalent 09/08/2010    Influenza - Quadrivalent 09/30/2021    Influenza - Quadrivalent - PF *Preferred* (6 months and older) 10/16/2003, 10/27/2004, 11/08/2005, 11/06/2007, 12/02/2008, 09/17/2009, 10/20/2011, 10/31/2012, 12/18/2014, 11/15/2016, 11/15/2016, 09/20/2017, 03/12/2019, 02/18/2020, 11/04/2020, 09/30/2021, 11/21/2022, 09/26/2023    Influenza - Trivalent - Fluarix, Flulaval, Fluzone, Afluria - PF 10/16/2003, 10/27/2004, 11/08/2005, 11/06/2007, 12/02/2008, 09/17/2009, 10/20/2011, 10/31/2012    Influenza A (H1N1) 2009 Monovalent - IM 12/10/2009    MMR 02/07/2003, 02/19/2009    Meningococcal B, OMV 03/12/2019, 09/03/2019    Meningococcal Conjugate (MCV4P) 05/01/2013, 09/10/2018    Pneumococcal Conjugate - 13 Valent 05/27/2010, 09/03/2019    Pneumococcal Conjugate - 7 Valent 2002, 2002, 03/07/2003    Pneumococcal Polysaccharide - 23 Valent 11/04/2004, 02/18/2020    Tdap 05/01/2013, 09/26/2023    Varicella 03/07/2003, 02/19/2009        Review of Systems   Constitutional: Negative.    HENT: Negative.     Eyes: Negative.    Respiratory: Negative.      Cardiovascular: Negative.    Gastrointestinal: Negative.    Genitourinary: Negative.    Musculoskeletal: Negative.    Skin: Negative.    Neurological: Negative.    Endo/Heme/Allergies: Negative.    Psychiatric/Behavioral: Negative.     All other systems reviewed and are negative.         Objective:      LMP  (LMP Unknown)      Physical Exam  Constitutional:       Appearance: Normal appearance.   HENT:      Head: Normocephalic.   Eyes:      Conjunctiva/sclera: Conjunctivae normal.   Pulmonary:      Effort: Pulmonary effort is normal. No respiratory distress.   Musculoskeletal:         General: Normal range of motion.      Cervical back: Normal range of motion.   Neurological:      General: No focal deficit present.      Mental Status: She is alert and oriented to person, place, and time. Mental status is at baseline.   Psychiatric:         Mood and Affect: Mood normal.         Behavior: Behavior normal.         Thought Content: Thought content normal.         Judgment: Judgment normal.          Labs:   Lab Results   Component Value Date    WBC 5.86 03/26/2024    HGB 12.2 03/26/2024    HCT 37.6 03/26/2024    MCV 93.3 03/26/2024     03/26/2024       CMP  Sodium   Date Value Ref Range Status   03/26/2024 140 136 - 145 mmol/L Final     Potassium   Date Value Ref Range Status   03/26/2024 3.8 3.5 - 5.1 mmol/L Final     Chloride   Date Value Ref Range Status   03/26/2024 105 98 - 107 mmol/L Final     CO2   Date Value Ref Range Status   03/26/2024 27 22 - 29 mmol/L Final     Blood Urea Nitrogen   Date Value Ref Range Status   03/26/2024 10.1 7.0 - 18.7 mg/dL Final     Creatinine   Date Value Ref Range Status   03/26/2024 0.83 0.55 - 1.02 mg/dL Final     Calcium   Date Value Ref Range Status   03/26/2024 10.4 (H) 8.4 - 10.2 mg/dL Final     Albumin   Date Value Ref Range Status   03/26/2024 4.4 3.5 - 5.0 g/dL Final     Bilirubin Total   Date Value Ref Range Status   03/26/2024 0.4 <=1.5 mg/dL Final     ALP   Date  Value Ref Range Status   03/26/2024 48 40 - 150 unit/L Final     AST   Date Value Ref Range Status   03/26/2024 51 (H) 5 - 34 unit/L Final     ALT   Date Value Ref Range Status   03/26/2024 20 0 - 55 unit/L Final     eGFR   Date Value Ref Range Status   03/26/2024 >60 mls/min/1.73/m2 Final     Lab Results   Component Value Date    TSH 0.938 08/11/2023     Hep C Ab Interp   Date Value Ref Range Status   07/24/2024 Nonreactive Nonreactive Final     Syphilis Antibody   Date Value Ref Range Status   08/11/2023 Nonreactive Nonreactive, Equivocal Final     Cholesterol Total   Date Value Ref Range Status   08/11/2023 125 <=200 mg/dL Final     HDL Cholesterol   Date Value Ref Range Status   08/11/2023 37 35 - 60 mg/dL Final     Triglyceride   Date Value Ref Range Status   08/11/2023 40 37 - 140 mg/dL Final     Cholesterol/HDL Ratio   Date Value Ref Range Status   08/11/2023 3 0 - 5 Final     Very Low Density Lipoprotein   Date Value Ref Range Status   08/11/2023 8  Final     LDL Cholesterol   Date Value Ref Range Status   08/11/2023 80.00 50.00 - 140.00 mg/dL Final     Vitamin D   Date Value Ref Range Status   03/26/2024 24.1 (L) 30.0 - 80.0 ng/mL Final     Results for orders placed or performed in visit on 08/11/23   CD4 Lymphocytes   Result Value Ref Range    Patient Age >18     WBC Absolute 5,950 4,500 - 11,500 /mm3    Lymph Percent 25 (L) 28 - 48 %    Lymph Absolute 1,487.5 1,260 - 5,520 x10(3)/mcL    CD4 % 44.42 %    CD4 Absolute 660.7475 589 - 1,505 unit/L    T Cell Interp       Normal absolute lymphocyte count with normal absolute CD4+ lymphocyte count.    Ion Pelayo M.D.       Narrative    This test was developed and its performance characteristics determined by [facility]. It has not been cleared or approved by the US Food and Drug Administration. The FDA does not require this test to go through premarket FDA review. This test is used for clinical purposes. It should not be  regarded as investigational or for research. This laboratory is certified under the Clinical Laboratory Improvement Amendments (CLIA) as qualified to perform high complexity clinical laboratory testing.     Results for orders placed or performed in visit on 03/26/24   HIV-1 RNA, Quantitative, PCR with Reflex to Genotype   Result Value Ref Range    HIV-1 RNA Detect/Quant, P Undetected Undetected copies/mL     Results for orders placed or performed in visit on 08/11/23   Quantiferon Gold TB   Result Value Ref Range    QuantiFERON-Tb Gold Plus Result Negative Negative    TB1 Ag minus Nil Result 0.00 IU/mL    TB2 Ag minus Nil Result 0.01 IU/mL    Mitogen minus Nil Result 9.99 IU/mL    Nil Result 0.01 IU/mL     No results found for this or any previous visit.  Results for orders placed or performed in visit on 08/11/23   Urinalysis   Result Value Ref Range    Color, UA Light-Yellow Yellow, Light-Yellow, Dark Yellow, Marcelle, Straw    Appearance, UA Clear Clear    Specific Gravity, UA 1.019     pH, UA 7.0 5.0 - 8.5    Protein, UA Negative Negative    Glucose, UA Normal Negative, Normal    Ketones, UA Negative Negative    Blood, UA Negative Negative    Bilirubin, UA Negative Negative    Urobilinogen, UA Normal 0.2, 1.0, Normal    Nitrites, UA Negative Negative    Leukocyte Esterase, UA Negative Negative    WBC, UA 0-5 None Seen, 0-2, 3-5, 0-5 /HPF    Bacteria, UA None Seen None Seen /HPF    Squamous Epithelial Cells, UA Moderate (A) None Seen /HPF    Mucous, UA Trace (A) None Seen /LPF    Hyaline Casts, UA None Seen None Seen /lpf    RBC, UA 0-5 None Seen, 0-2, 3-5, 0-5 /HPF       Imaging: Reviewed most recent relevant imaging studies available, notable results highlighted in this note    Medications:     Current Outpatient Medications   Medication Instructions    wpktpjcaa-jzlrstde-aodaxgx ala (BIKTARVY) -25 mg (25 kg or greater) 1 tablet, Oral, Daily    cetirizine (ZYRTEC) 10 mg, Daily PRN    cholecalciferol (vitamin  D3) (VITAMIN D3) 1,000 Units, Oral, Daily    sumatriptan (IMITREX) 25 mg, As needed (PRN)       Assessment:       Problem List Items Addressed This Visit    None  Visit Diagnoses       HIV disease    -  Primary    Relevant Medications    qbtymlmfk-sdyybmjg-vvazyzy ala (BIKTARVY) -25 mg (25 kg or greater)    Other Relevant Orders    HIV-1 RNA, Quantitative, PCR with Reflex to Genotype    TSH    Vitamin D    CD4 T-Timblin Cells    CBC Auto Differential    Comprehensive Metabolic Panel    Hepatitis Panel, Acute    Lipid Panel    Quantiferon Gold TB    Urinalysis, Reflex to Urine Culture    G6PD deficiency        Vitamin D deficiency        Routine screening for STI (sexually transmitted infection)        Relevant Orders    Chlamydia/GC, PCR    SYPHILIS ANTIBODY (WITH REFLEX RPR)               Plan:      HIV disease  -     cjiwlrdnk-kuymztxg-dviwmqh ala (BIKTARVY) -25 mg (25 kg or greater); Take 1 tablet by mouth once daily.  Dispense: 30 tablet; Refill: 0  -     HIV-1 RNA, Quantitative, PCR with Reflex to Genotype; Future; Expected date: 11/19/2024  -     TSH; Future; Expected date: 11/19/2024  -     Vitamin D; Future; Expected date: 11/19/2024  -     CD4 T-Timblin Cells; Future; Expected date: 11/19/2024  -     CBC Auto Differential; Future; Expected date: 11/19/2024  -     Comprehensive Metabolic Panel; Future; Expected date: 11/19/2024  -     Hepatitis Panel, Acute; Future; Expected date: 11/19/2024  -     Lipid Panel; Future; Expected date: 11/19/2024  -     Quantiferon Gold TB; Future; Expected date: 11/19/2024  -     Urinalysis, Reflex to Urine Culture; Future; Expected date: 11/19/2024  Dx @ birth   Initial HIV VL unknown and CD4 joy unknown. Documentation is reflective of AIDS dx of birth  OI hx: No hx     Adherence and sexual health counseling done.  Use condoms for all sexual encounters.  Blood precautions.  Continue Biktarvy as prescribed.  Labs this week  RTC 6 weeks with Kayley for an in office  visit      Wellness:  Cervical pap:4/30/24  Cervical CT/GC:4/30/24  Eye exam: referral sent  Vaccines recommended    G6PD deficiency  Avoid andrez beans, ASA, and Sulfonamides (Dapsone, Macrobid, Primaquine, etc).     Vitamin D deficiency  Continue meds. Pt educated on the importance of Vit D to bone and organ health.     Routine screening for STI (sexually transmitted infection)  -     Chlamydia/GC, PCR; Future; Expected date: 11/19/2024  -     SYPHILIS ANTIBODY (WITH REFLEX RPR); Future; Expected date: 11/19/2024

## 2025-06-04 ENCOUNTER — OFFICE VISIT (OUTPATIENT)
Dept: INFECTIOUS DISEASES | Facility: CLINIC | Age: 25
End: 2025-06-04
Payer: COMMERCIAL

## 2025-06-04 ENCOUNTER — LAB VISIT (OUTPATIENT)
Dept: LAB | Facility: HOSPITAL | Age: 25
End: 2025-06-04
Attending: NURSE PRACTITIONER
Payer: COMMERCIAL

## 2025-06-04 ENCOUNTER — RESULTS FOLLOW-UP (OUTPATIENT)
Dept: INFECTIOUS DISEASES | Facility: CLINIC | Age: 25
End: 2025-06-04

## 2025-06-04 ENCOUNTER — TELEPHONE (OUTPATIENT)
Dept: INFECTIOUS DISEASES | Facility: CLINIC | Age: 25
End: 2025-06-04

## 2025-06-04 VITALS
DIASTOLIC BLOOD PRESSURE: 63 MMHG | SYSTOLIC BLOOD PRESSURE: 100 MMHG | HEIGHT: 60 IN | BODY MASS INDEX: 19.04 KG/M2 | WEIGHT: 97 LBS | HEART RATE: 75 BPM

## 2025-06-04 DIAGNOSIS — R87.810 ASCUS WITH POSITIVE HIGH RISK HPV CERVICAL: ICD-10-CM

## 2025-06-04 DIAGNOSIS — B20 HIV DISEASE: ICD-10-CM

## 2025-06-04 DIAGNOSIS — Z11.3 ROUTINE SCREENING FOR STI (SEXUALLY TRANSMITTED INFECTION): ICD-10-CM

## 2025-06-04 DIAGNOSIS — Z01.419 ENCOUNTER FOR CERVICAL PAP SMEAR WITH PELVIC EXAM: ICD-10-CM

## 2025-06-04 DIAGNOSIS — Z00.00 WELL ADULT EXAM: ICD-10-CM

## 2025-06-04 DIAGNOSIS — Z21 HIV INFECTION, UNSPECIFIED SYMPTOM STATUS: Primary | ICD-10-CM

## 2025-06-04 DIAGNOSIS — Z13.220 SCREENING FOR LIPID DISORDERS: ICD-10-CM

## 2025-06-04 DIAGNOSIS — E55.9 VITAMIN D DEFICIENCY: ICD-10-CM

## 2025-06-04 DIAGNOSIS — Z13.1 SCREENING FOR DIABETES MELLITUS: ICD-10-CM

## 2025-06-04 DIAGNOSIS — N63.11 MASS OF UPPER OUTER QUADRANT OF RIGHT BREAST: ICD-10-CM

## 2025-06-04 DIAGNOSIS — R87.610 ASCUS WITH POSITIVE HIGH RISK HPV CERVICAL: ICD-10-CM

## 2025-06-04 DIAGNOSIS — D50.0 IRON DEFICIENCY ANEMIA DUE TO CHRONIC BLOOD LOSS: ICD-10-CM

## 2025-06-04 DIAGNOSIS — B20 HIV DISEASE: Primary | ICD-10-CM

## 2025-06-04 DIAGNOSIS — D75.A G6PD DEFICIENCY: ICD-10-CM

## 2025-06-04 LAB
25(OH)D3+25(OH)D2 SERPL-MCNC: 21 NG/ML (ref 30–80)
ALBUMIN SERPL-MCNC: 4.5 G/DL (ref 3.5–5)
ALBUMIN/GLOB SERPL: 1.1 RATIO (ref 1.1–2)
ALP SERPL-CCNC: 39 UNIT/L (ref 40–150)
ALT SERPL-CCNC: 10 UNIT/L (ref 0–55)
ANION GAP SERPL CALC-SCNC: 9 MEQ/L
AST SERPL-CCNC: 23 UNIT/L (ref 11–45)
B-HCG UR QL: NEGATIVE
BACTERIA #/AREA URNS AUTO: ABNORMAL /HPF
BASOPHILS # BLD AUTO: 0.03 X10(3)/MCL
BASOPHILS NFR BLD AUTO: 0.7 %
BILIRUB SERPL-MCNC: 0.8 MG/DL
BILIRUB UR QL STRIP.AUTO: NEGATIVE
BUN SERPL-MCNC: 9.6 MG/DL (ref 7–18.7)
C TRACH DNA SPEC QL NAA+PROBE: NOT DETECTED
CALCIUM SERPL-MCNC: 9.5 MG/DL (ref 8.4–10.2)
CHLORIDE SERPL-SCNC: 104 MMOL/L (ref 98–107)
CHOLEST SERPL-MCNC: 130 MG/DL
CHOLEST/HDLC SERPL: 3 {RATIO} (ref 0–5)
CLARITY UR: CLEAR
CO2 SERPL-SCNC: 25 MMOL/L (ref 22–29)
COLOR UR AUTO: YELLOW
CREAT SERPL-MCNC: 0.77 MG/DL (ref 0.55–1.02)
CREAT/UREA NIT SERPL: 12
EOSINOPHIL # BLD AUTO: 0.07 X10(3)/MCL (ref 0–0.9)
EOSINOPHIL NFR BLD AUTO: 1.6 %
ERYTHROCYTE [DISTWIDTH] IN BLOOD BY AUTOMATED COUNT: 12.1 % (ref 11.5–17)
EST. AVERAGE GLUCOSE BLD GHB EST-MCNC: 96.8 MG/DL
FERRITIN SERPL-MCNC: 78.4 NG/ML (ref 4.63–204)
GFR SERPLBLD CREATININE-BSD FMLA CKD-EPI: >60 ML/MIN/1.73/M2
GLOBULIN SER-MCNC: 4 GM/DL (ref 2.4–3.5)
GLUCOSE SERPL-MCNC: 83 MG/DL (ref 74–100)
GLUCOSE UR QL STRIP: NORMAL
HAV IGM SERPL QL IA: NONREACTIVE
HBA1C MFR BLD: 5 %
HBV CORE IGM SERPL QL IA: NONREACTIVE
HBV SURFACE AG SERPL QL IA: NONREACTIVE
HCT VFR BLD AUTO: 35.7 % (ref 37–47)
HCV AB SERPL QL IA: NONREACTIVE
HDLC SERPL-MCNC: 43 MG/DL (ref 35–60)
HGB BLD-MCNC: 11.5 G/DL (ref 12–16)
HGB UR QL STRIP: NEGATIVE
HYALINE CASTS #/AREA URNS LPF: ABNORMAL /LPF
IMM GRANULOCYTES # BLD AUTO: 0.01 X10(3)/MCL (ref 0–0.04)
IMM GRANULOCYTES NFR BLD AUTO: 0.2 %
IRON SATN MFR SERPL: 26 % (ref 20–50)
IRON SERPL-MCNC: 85 UG/DL (ref 50–170)
KETONES UR QL STRIP: ABNORMAL
LDLC SERPL CALC-MCNC: 79 MG/DL (ref 50–140)
LEUKOCYTE ESTERASE UR QL STRIP: NEGATIVE
LYMPHOCYTES # BLD AUTO: 1.78 X10(3)/MCL (ref 0.6–4.6)
LYMPHOCYTES NFR BLD AUTO: 41.5 %
MCH RBC QN AUTO: 29.9 PG (ref 27–31)
MCHC RBC AUTO-ENTMCNC: 32.2 G/DL (ref 33–36)
MCV RBC AUTO: 93 FL (ref 80–94)
MONOCYTES # BLD AUTO: 0.35 X10(3)/MCL (ref 0.1–1.3)
MONOCYTES NFR BLD AUTO: 8.2 %
MUCOUS THREADS URNS QL MICRO: ABNORMAL /LPF
N GONORRHOEA DNA SPEC QL NAA+PROBE: NOT DETECTED
NEUTROPHILS # BLD AUTO: 2.05 X10(3)/MCL (ref 2.1–9.2)
NEUTROPHILS NFR BLD AUTO: 47.8 %
NITRITE UR QL STRIP: NEGATIVE
NRBC BLD AUTO-RTO: 0 %
PH UR STRIP: 8 [PH]
PLATELET # BLD AUTO: 265 X10(3)/MCL (ref 130–400)
PMV BLD AUTO: 9.4 FL (ref 7.4–10.4)
POTASSIUM SERPL-SCNC: 3.2 MMOL/L (ref 3.5–5.1)
PROT SERPL-MCNC: 8.5 GM/DL (ref 6.4–8.3)
PROT UR QL STRIP: ABNORMAL
RBC # BLD AUTO: 3.84 X10(6)/MCL (ref 4.2–5.4)
RBC #/AREA URNS AUTO: ABNORMAL /HPF
SODIUM SERPL-SCNC: 138 MMOL/L (ref 136–145)
SP GR UR STRIP.AUTO: 1.03 (ref 1–1.03)
SPECIMEN SOURCE: NORMAL
SQUAMOUS #/AREA URNS LPF: ABNORMAL /HPF
T PALLIDUM AB SER QL: NONREACTIVE
T4 FREE SERPL-MCNC: 1.12 NG/DL (ref 0.7–1.48)
TIBC SERPL-MCNC: 242 UG/DL (ref 70–310)
TIBC SERPL-MCNC: 327 UG/DL (ref 250–450)
TRANSFERRIN SERPL-MCNC: 300 MG/DL (ref 180–382)
TRIGL SERPL-MCNC: 40 MG/DL (ref 37–140)
TSH SERPL-ACNC: 1.08 UIU/ML (ref 0.35–4.94)
UROBILINOGEN UR STRIP-ACNC: ABNORMAL
VLDLC SERPL CALC-MCNC: 8 MG/DL
WBC # BLD AUTO: 4.29 X10(3)/MCL (ref 4.5–11.5)
WBC #/AREA URNS AUTO: ABNORMAL /HPF

## 2025-06-04 PROCEDURE — 80074 ACUTE HEPATITIS PANEL: CPT

## 2025-06-04 PROCEDURE — 87536 HIV-1 QUANT&REVRSE TRNSCRPJ: CPT

## 2025-06-04 PROCEDURE — 86359 T CELLS TOTAL COUNT: CPT

## 2025-06-04 PROCEDURE — 85025 COMPLETE CBC W/AUTO DIFF WBC: CPT

## 2025-06-04 PROCEDURE — 80053 COMPREHEN METABOLIC PANEL: CPT

## 2025-06-04 PROCEDURE — 86480 TB TEST CELL IMMUN MEASURE: CPT

## 2025-06-04 PROCEDURE — 83036 HEMOGLOBIN GLYCOSYLATED A1C: CPT

## 2025-06-04 PROCEDURE — 84439 ASSAY OF FREE THYROXINE: CPT

## 2025-06-04 PROCEDURE — 3074F SYST BP LT 130 MM HG: CPT | Mod: CPTII,,, | Performed by: NURSE PRACTITIONER

## 2025-06-04 PROCEDURE — 99214 OFFICE O/P EST MOD 30 MIN: CPT | Mod: S$PBB,,, | Performed by: NURSE PRACTITIONER

## 2025-06-04 PROCEDURE — 86780 TREPONEMA PALLIDUM: CPT

## 2025-06-04 PROCEDURE — 82306 VITAMIN D 25 HYDROXY: CPT

## 2025-06-04 PROCEDURE — 1159F MED LIST DOCD IN RCRD: CPT | Mod: CPTII,,, | Performed by: NURSE PRACTITIONER

## 2025-06-04 PROCEDURE — 87591 N.GONORRHOEAE DNA AMP PROB: CPT | Performed by: NURSE PRACTITIONER

## 2025-06-04 PROCEDURE — 80061 LIPID PANEL: CPT

## 2025-06-04 PROCEDURE — 99214 OFFICE O/P EST MOD 30 MIN: CPT | Mod: PBBFAC | Performed by: NURSE PRACTITIONER

## 2025-06-04 PROCEDURE — 88174 CYTOPATH C/V AUTO IN FLUID: CPT | Performed by: NURSE PRACTITIONER

## 2025-06-04 PROCEDURE — 84443 ASSAY THYROID STIM HORMONE: CPT

## 2025-06-04 PROCEDURE — 83540 ASSAY OF IRON: CPT

## 2025-06-04 PROCEDURE — 3008F BODY MASS INDEX DOCD: CPT | Mod: CPTII,,, | Performed by: NURSE PRACTITIONER

## 2025-06-04 PROCEDURE — 3078F DIAST BP <80 MM HG: CPT | Mod: CPTII,,, | Performed by: NURSE PRACTITIONER

## 2025-06-04 PROCEDURE — 36415 COLL VENOUS BLD VENIPUNCTURE: CPT

## 2025-06-04 PROCEDURE — 0219U NFCT AGT HIV GNRJ SEQ ALYS: CPT

## 2025-06-04 PROCEDURE — 1160F RVW MEDS BY RX/DR IN RCRD: CPT | Mod: CPTII,,, | Performed by: NURSE PRACTITIONER

## 2025-06-04 PROCEDURE — 82728 ASSAY OF FERRITIN: CPT

## 2025-06-04 PROCEDURE — 81025 URINE PREGNANCY TEST: CPT | Performed by: NURSE PRACTITIONER

## 2025-06-04 PROCEDURE — 81001 URINALYSIS AUTO W/SCOPE: CPT | Performed by: NURSE PRACTITIONER

## 2025-06-04 RX ORDER — VIT C/E/ZN/COPPR/LUTEIN/ZEAXAN 250MG-90MG
1000 CAPSULE ORAL DAILY
Qty: 30 CAPSULE | Refills: 4 | Status: SHIPPED | OUTPATIENT
Start: 2025-06-04

## 2025-06-04 RX ORDER — BICTEGRAVIR SODIUM, EMTRICITABINE, AND TENOFOVIR ALAFENAMIDE FUMARATE 50; 200; 25 MG/1; MG/1; MG/1
1 TABLET ORAL DAILY
Qty: 30 TABLET | Refills: 4 | Status: SHIPPED | OUTPATIENT
Start: 2025-06-04

## 2025-06-04 NOTE — PROGRESS NOTES
Reviewed labs. Potassium is low at 3.2. Normal is 3.5. Please contact the patient with the following recommendations:  Increase intake of bananas, oranges, melons, dark leafy greens, electrolyte replacement drinks, potatoes.     Also, please let her know I sent an rx for the Vit D and Biktarvy to her pharmacy.

## 2025-06-05 ENCOUNTER — TELEPHONE (OUTPATIENT)
Dept: INFECTIOUS DISEASES | Facility: CLINIC | Age: 25
End: 2025-06-05
Payer: COMMERCIAL

## 2025-06-05 LAB
CD3 CELLS # BLD: 1566 CELLS/MCL (ref 550–2202)
CD3 CELLS NFR BLD: 83 % (ref 58–86)
CD3+CD4+ CELLS # BLD: 746 CELLS/MCL (ref 365–1437)
CD3+CD4+ CELLS NFR BLD: 40 % (ref 32–64)
CD3+CD4+ CELLS/CD3+CD8+ CLL BLD: 1 %
CD3+CD8+ CELLS # BLD: 732 CELLS/MCL (ref 199–846)
CD3+CD8+ CELLS NFR BLD: 39 % (ref 18–40)
CD45 CELLS # BLD: 1.89 THOU/MCL (ref 0.82–2.84)

## 2025-06-06 ENCOUNTER — TELEPHONE (OUTPATIENT)
Dept: INFECTIOUS DISEASES | Facility: CLINIC | Age: 25
End: 2025-06-06
Payer: COMMERCIAL

## 2025-06-06 LAB
GAMMA INTERFERON BACKGROUND BLD IA-ACNC: 0.01 IU/ML
HIV1 RNA # PLAS NAA DL=20: ABNORMAL COPIES/ML
M TB IFN-G BLD-IMP: NEGATIVE
M TB IFN-G CD4+ BCKGRND COR BLD-ACNC: 0 IU/ML
M TB IFN-G CD4+CD8+ BCKGRND COR BLD-ACNC: 0 IU/ML
MITOGEN IGNF BCKGRD COR BLD-ACNC: 9.2 IU/ML

## 2025-06-06 NOTE — PROGRESS NOTES
HIV VL is [address].  Kayley's note reviewed, pt reported 100% adherence to Biktarvy.  Please phone pt with results.  Did she have an interruption in treatment and recently restart medications?  Resistance testing still pending. Thank you.

## 2025-06-09 ENCOUNTER — TELEPHONE (OUTPATIENT)
Dept: INFECTIOUS DISEASES | Facility: CLINIC | Age: 25
End: 2025-06-09
Payer: COMMERCIAL

## 2025-06-09 NOTE — TELEPHONE ENCOUNTER
Patient informed of results and providers recommendations. All questions answered. Patient verbalized understanding.     Kiley please place pt on the scheule for 07/07/25 at 09:10 with Kayley, she knows about the appt.

## 2025-06-09 NOTE — PROGRESS NOTES
Reviewed labs. K 3.2.  Please contact patient and let her know that she needs to increase her intake of bananas, oranges, melons, dark leafy greens, electrolyte replacement drinks, potatoes. HIV VL was elevated. Katelyn informed her that HIV GT is pending. 4 month follow up will need to be cancelled and changed to 4 week follow up.

## 2025-06-09 NOTE — TELEPHONE ENCOUNTER
----- Message from OLIVIA Queen sent at 6/9/2025 11:27 AM CDT -----  Reviewed labs. K 3.2.  Please contact patient and let her know that she needs to increase her intake of bananas, oranges, melons, dark leafy greens, electrolyte replacement drinks, potatoes. HIV VL   was elevated. Katelyn informed her that HIV GT is pending. 4 month follow up will need to be cancelled and changed to 4 week follow up.    ----- Message -----  From: Lab, Background User  Sent: 6/4/2025  10:59 AM CDT  To: OLIVIA Patterson

## 2025-06-10 ENCOUNTER — TELEPHONE (OUTPATIENT)
Dept: INFECTIOUS DISEASES | Facility: CLINIC | Age: 25
End: 2025-06-10
Payer: COMMERCIAL

## 2025-06-10 DIAGNOSIS — N63.11 MASS OF UPPER OUTER QUADRANT OF RIGHT BREAST: Primary | ICD-10-CM

## 2025-06-10 LAB
ABACAVIR ISLT GENOTYP: NORMAL
ATAZANAVIR+RITONAVIR ISLT GENOTYP: NORMAL
BICTEGRAVIR ISLT GENOTYP: NORMAL
CABOTEGRAVIR ISLT GENOTYP: NORMAL
DARUNAVIR+RITONAVIR ISLT GENOTYP: NORMAL
DIDANOSINE ISLT GENOTYP: NORMAL
DOLUTEGRAVIR ISLT GENOTYP: NORMAL
DORAVIRINE ISLT GENOTYP: NORMAL
EFAVIRENZ ISLT GENOTYP: NORMAL
ELVITEGRAVIR ISLT GENOTYP: NORMAL
EMTRICITABINE ISLT GENOTYP: NORMAL
ETRAVIRINE ISLT GENOTYP: NORMAL
FOSAMPRENAVIR+RITONAVIR ISLT GENOTYP: NORMAL
HIV 1 RNA GENOTYPE ISLT: NORMAL
HIV 1 RNA RT + PR + IN MUT DET PLAS SEQ: NORMAL
HIV NNRTI GENE MUT DET ISLT: NORMAL
HIV NRTI GENE MUT DET ISLT: NORMAL
HIV PI GENE MUT DET ISLT: NORMAL
HIV1 INTEGRASE GENE MUT DET ISLT: NORMAL
INDINAVIR+RITONAVIR ISLT GENOTYP: NORMAL
LAB AOE PNL PATIENT: NORMAL
LAMIVUDINE ISLT GENOTYP: NORMAL
LOPINAVIR+RITONAVIR ISLT GENOTYP: NORMAL
M INTEGRASE FAILED CODONS: NORMAL
M PROTEASE FAILED CODONS: NORMAL
M REVERSE TRANSCRIPTASE FAILED CODONS: NORMAL
NELFINAVIR ISLT GENOTYP: NORMAL
NEVIRAPINE ISLT GENOTYP: NORMAL
RALTEGRAVIR ISLT GENOTYP: NORMAL
RILPIVIRINE ISLT GENOTYP: NORMAL
SAQUINAVIR+RITONAVIR ISLT GENOTYP: NORMAL
STAVUDINE ISLT GENOTYP: NORMAL
TENOFOVIR ISLT GENOTYP: NORMAL
TIPRANAVIR+RITONAVIR ISLT GENOTYP: NORMAL
ZIDOVUDINE ISLT GENOTYP: NORMAL

## 2025-06-10 NOTE — TELEPHONE ENCOUNTER
----- Message from OLIVIA Queen sent at 6/10/2025  2:18 PM CDT -----  Reviewed labs. HIV GT shows no resistance despite having a HIV VL of 14,100. Pt needs to make sure she is taking her medication everyday at the same time not missing any doses. She has a follow up   appt 7/7/25.  She will need lab work prior to that to ensure that her medication is working.  I tried to contact her. VM left for patient to contact me.  ----- Message -----  From: Lab, Background User  Sent: 6/4/2025  10:59 AM CDT  To: OLIVIA Patterson

## 2025-06-10 NOTE — PROGRESS NOTES
Reviewed labs. HIV GT shows no resistance despite having a HIV VL of 14,100. Pt needs to make sure she is taking her medication everyday at the same time not missing any doses. She has a follow up appt 7/7/25.  She will need lab work prior to that to ensure that her medication is working.  I tried to contact her. VM left for patient to contact me.

## 2025-06-10 NOTE — TELEPHONE ENCOUNTER
Mammogram ordered. However, patient is under the age of 32 so she will only a right breast US. Order placed. Please cancel mammogram order.

## 2025-07-02 ENCOUNTER — HOSPITAL ENCOUNTER (OUTPATIENT)
Dept: RADIOLOGY | Facility: HOSPITAL | Age: 25
Discharge: HOME | End: 2025-07-02
Attending: NURSE PRACTITIONER
Payer: COMMERCIAL

## 2025-07-02 DIAGNOSIS — N63.11 MASS OF UPPER OUTER QUADRANT OF RIGHT BREAST: ICD-10-CM

## 2025-07-02 PROCEDURE — 76882 US LMTD JT/FCL EVL NVASC XTR: CPT | Mod: 26,RT,, | Performed by: RADIOLOGY

## 2025-07-02 PROCEDURE — 76882 US LMTD JT/FCL EVL NVASC XTR: CPT | Mod: TC,RT

## 2025-07-02 PROCEDURE — 76642 ULTRASOUND BREAST LIMITED: CPT | Mod: TC,RT

## 2025-07-07 ENCOUNTER — OFFICE VISIT (OUTPATIENT)
Dept: INFECTIOUS DISEASES | Facility: CLINIC | Age: 25
End: 2025-07-07
Payer: COMMERCIAL

## 2025-07-07 ENCOUNTER — LAB VISIT (OUTPATIENT)
Dept: LAB | Facility: HOSPITAL | Age: 25
End: 2025-07-07
Attending: NURSE PRACTITIONER
Payer: COMMERCIAL

## 2025-07-07 VITALS
RESPIRATION RATE: 12 BRPM | BODY MASS INDEX: 19.1 KG/M2 | DIASTOLIC BLOOD PRESSURE: 69 MMHG | SYSTOLIC BLOOD PRESSURE: 121 MMHG | HEIGHT: 60 IN | HEART RATE: 89 BPM | WEIGHT: 97.31 LBS | TEMPERATURE: 99 F

## 2025-07-07 DIAGNOSIS — D75.A G6PD DEFICIENCY: ICD-10-CM

## 2025-07-07 DIAGNOSIS — E55.9 VITAMIN D DEFICIENCY: ICD-10-CM

## 2025-07-07 DIAGNOSIS — Z00.00 WELL ADULT EXAM: ICD-10-CM

## 2025-07-07 DIAGNOSIS — B20 HIV DISEASE: ICD-10-CM

## 2025-07-07 DIAGNOSIS — Z23 IMMUNIZATION DUE: ICD-10-CM

## 2025-07-07 DIAGNOSIS — B20 HIV DISEASE: Primary | ICD-10-CM

## 2025-07-07 LAB
ALBUMIN SERPL-MCNC: 3.9 G/DL (ref 3.5–5)
ALBUMIN/CREAT UR: 8.7 MG/GM CR (ref 0–30)
ALBUMIN/GLOB SERPL: 0.9 RATIO (ref 1.1–2)
ALP SERPL-CCNC: 43 UNIT/L (ref 40–150)
ALT SERPL-CCNC: 8 UNIT/L (ref 0–55)
ANION GAP SERPL CALC-SCNC: 5 MEQ/L
AST SERPL-CCNC: 18 UNIT/L (ref 11–45)
BACTERIA #/AREA URNS AUTO: ABNORMAL /HPF
BASOPHILS # BLD AUTO: 0.02 X10(3)/MCL
BASOPHILS NFR BLD AUTO: 0.4 %
BILIRUB SERPL-MCNC: 0.4 MG/DL
BILIRUB UR QL STRIP.AUTO: NEGATIVE
BUN SERPL-MCNC: 12 MG/DL (ref 7–18.7)
CALCIUM SERPL-MCNC: 9.4 MG/DL (ref 8.4–10.2)
CHLORIDE SERPL-SCNC: 105 MMOL/L (ref 98–107)
CLARITY UR: CLEAR
CO2 SERPL-SCNC: 27 MMOL/L (ref 22–29)
COLOR UR AUTO: ABNORMAL
CREAT SERPL-MCNC: 0.88 MG/DL (ref 0.55–1.02)
CREAT UR-MCNC: 92.7 MG/DL (ref 45–106)
CREAT/UREA NIT SERPL: 14
EOSINOPHIL # BLD AUTO: 0.08 X10(3)/MCL (ref 0–0.9)
EOSINOPHIL NFR BLD AUTO: 1.5 %
ERYTHROCYTE [DISTWIDTH] IN BLOOD BY AUTOMATED COUNT: 12.6 % (ref 11.5–17)
GFR SERPLBLD CREATININE-BSD FMLA CKD-EPI: >60 ML/MIN/1.73/M2
GLOBULIN SER-MCNC: 4.4 GM/DL (ref 2.4–3.5)
GLUCOSE SERPL-MCNC: 85 MG/DL (ref 74–100)
GLUCOSE UR QL STRIP: NORMAL
HCT VFR BLD AUTO: 37.4 % (ref 37–47)
HGB BLD-MCNC: 12.1 G/DL (ref 12–16)
HGB UR QL STRIP: NEGATIVE
HYALINE CASTS #/AREA URNS LPF: ABNORMAL /LPF
IMM GRANULOCYTES # BLD AUTO: 0.02 X10(3)/MCL (ref 0–0.04)
IMM GRANULOCYTES NFR BLD AUTO: 0.4 %
KETONES UR QL STRIP: NEGATIVE
LEUKOCYTE ESTERASE UR QL STRIP: NEGATIVE
LYMPHOCYTES # BLD AUTO: 1.77 X10(3)/MCL (ref 0.6–4.6)
LYMPHOCYTES NFR BLD AUTO: 34.1 %
MCH RBC QN AUTO: 30.1 PG (ref 27–31)
MCHC RBC AUTO-ENTMCNC: 32.4 G/DL (ref 33–36)
MCV RBC AUTO: 93 FL (ref 80–94)
MICROALBUMIN UR-MCNC: 8.1 UG/ML
MONOCYTES # BLD AUTO: 0.43 X10(3)/MCL (ref 0.1–1.3)
MONOCYTES NFR BLD AUTO: 8.3 %
MUCOUS THREADS URNS QL MICRO: ABNORMAL /LPF
NEUTROPHILS # BLD AUTO: 2.87 X10(3)/MCL (ref 2.1–9.2)
NEUTROPHILS NFR BLD AUTO: 55.3 %
NITRITE UR QL STRIP: NEGATIVE
NRBC BLD AUTO-RTO: 0 %
PH UR STRIP: 7.5 [PH]
PLATELET # BLD AUTO: 329 X10(3)/MCL (ref 130–400)
PMV BLD AUTO: 9.4 FL (ref 7.4–10.4)
POTASSIUM SERPL-SCNC: 4.2 MMOL/L (ref 3.5–5.1)
PROT SERPL-MCNC: 8.3 GM/DL (ref 6.4–8.3)
PROT UR QL STRIP: NEGATIVE
RBC # BLD AUTO: 4.02 X10(6)/MCL (ref 4.2–5.4)
RBC #/AREA URNS AUTO: ABNORMAL /HPF
SODIUM SERPL-SCNC: 137 MMOL/L (ref 136–145)
SP GR UR STRIP.AUTO: 1.02 (ref 1–1.03)
SQUAMOUS #/AREA URNS LPF: ABNORMAL /HPF
UROBILINOGEN UR STRIP-ACNC: NORMAL
WBC # BLD AUTO: 5.19 X10(3)/MCL (ref 4.5–11.5)
WBC #/AREA URNS AUTO: ABNORMAL /HPF

## 2025-07-07 PROCEDURE — 3078F DIAST BP <80 MM HG: CPT | Mod: CPTII,,, | Performed by: NURSE PRACTITIONER

## 2025-07-07 PROCEDURE — 87536 HIV-1 QUANT&REVRSE TRNSCRPJ: CPT

## 2025-07-07 PROCEDURE — 36415 COLL VENOUS BLD VENIPUNCTURE: CPT

## 2025-07-07 PROCEDURE — 90734 MENACWYD/MENACWYCRM VACC IM: CPT | Mod: PBBFAC

## 2025-07-07 PROCEDURE — 85025 COMPLETE CBC W/AUTO DIFF WBC: CPT

## 2025-07-07 PROCEDURE — 99214 OFFICE O/P EST MOD 30 MIN: CPT | Mod: PBBFAC | Performed by: NURSE PRACTITIONER

## 2025-07-07 PROCEDURE — 90471 IMMUNIZATION ADMIN: CPT | Mod: PBBFAC

## 2025-07-07 PROCEDURE — 90677 PCV20 VACCINE IM: CPT | Mod: PBBFAC

## 2025-07-07 PROCEDURE — 3008F BODY MASS INDEX DOCD: CPT | Mod: CPTII,,, | Performed by: NURSE PRACTITIONER

## 2025-07-07 PROCEDURE — 99214 OFFICE O/P EST MOD 30 MIN: CPT | Mod: S$PBB,,, | Performed by: NURSE PRACTITIONER

## 2025-07-07 PROCEDURE — 90472 IMMUNIZATION ADMIN EACH ADD: CPT | Mod: PBBFAC

## 2025-07-07 PROCEDURE — 81001 URINALYSIS AUTO W/SCOPE: CPT

## 2025-07-07 PROCEDURE — 86361 T CELL ABSOLUTE COUNT: CPT

## 2025-07-07 PROCEDURE — 80053 COMPREHEN METABOLIC PANEL: CPT

## 2025-07-07 PROCEDURE — 3044F HG A1C LEVEL LT 7.0%: CPT | Mod: CPTII,,, | Performed by: NURSE PRACTITIONER

## 2025-07-07 PROCEDURE — 3074F SYST BP LT 130 MM HG: CPT | Mod: CPTII,,, | Performed by: NURSE PRACTITIONER

## 2025-07-07 PROCEDURE — 1159F MED LIST DOCD IN RCRD: CPT | Mod: CPTII,,, | Performed by: NURSE PRACTITIONER

## 2025-07-07 PROCEDURE — 1160F RVW MEDS BY RX/DR IN RCRD: CPT | Mod: CPTII,,, | Performed by: NURSE PRACTITIONER

## 2025-07-07 PROCEDURE — 82043 UR ALBUMIN QUANTITATIVE: CPT

## 2025-07-07 RX ORDER — BICTEGRAVIR SODIUM, EMTRICITABINE, AND TENOFOVIR ALAFENAMIDE FUMARATE 50; 200; 25 MG/1; MG/1; MG/1
1 TABLET ORAL DAILY
Qty: 30 TABLET | Refills: 4 | Status: SHIPPED | OUTPATIENT
Start: 2025-07-07

## 2025-07-07 RX ORDER — VIT C/E/ZN/COPPR/LUTEIN/ZEAXAN 250MG-90MG
1000 CAPSULE ORAL DAILY
Qty: 30 CAPSULE | Refills: 4 | Status: SHIPPED | OUTPATIENT
Start: 2025-07-07

## 2025-07-07 RX ADMIN — PNEUMOCOCCAL 20-VALENT CONJUGATE VACCINE 0.5 ML
2.2; 2.2; 2.2; 2.2; 2.2; 2.2; 2.2; 2.2; 2.2; 2.2; 2.2; 2.2; 2.2; 2.2; 2.2; 2.2; 4.4; 2.2; 2.2; 2.2 INJECTION, SUSPENSION INTRAMUSCULAR at 09:07

## 2025-07-07 RX ADMIN — MENINGOCOCCAL (GROUPS A, C, Y AND W-135) OLIGOSACCHARIDE DIPHTHERIA CRM197 CONJUGATE VACCINE 0.5 ML: 10; 5; 5; 5 INJECTION, SOLUTION INTRAMUSCULAR at 09:07

## 2025-07-07 NOTE — PROGRESS NOTES
Patient ID: Dinorah Agosto 25 y.o.     Chief Complaint:   Chief Complaint   Patient presents with    Follow-up    HIV Positive/AIDS        HPI:    7/7/25  Dinorah Agosto is a 26 y/o AAF here for AIDS f/u. MSF. G6PD def, HLA  neg.  Dx with HIV @ birth, but was not told by her adoptive mother until 3.5 years ago when she became sexually active.  Previously followed in peds clinic by Dr. Mcrae. Pt endorses 100% adherence to Biktarvy. States she is tolerating the meds well. HIV GT from 6/4/25 no Jeff or resistance noted.  Pt denies fever, headache, chills, visual problems, N/V/D, SOB, cough, chest pain or abd pain. Reviewed labs from 6/4/25 HIV VL 14,100. CD4 746, and Vit D 21.  Pt is scheduled for an eye exam 10/28/24.  Cervical pap smear showed ASCUS 6/4/25. Pt is scheduled for a colposcopy 9/5/24.  She is due for a PCV20 and Menveo booster. Pt is amenable to both.    6/4/25  Dinorah Agosto is a 26 y/o AAF here for AIDS f/u. MSF.  G6PD def, HLA  neg.  Dx with HIV @ birth, but was not told by her adoptive mother until 3.5 years ago when she became sexually active.  Previously followed in peds clinic by Dr. Mcrae. Pt endorses 100% adherence to Biktarvy, tolerating the meds well.  Denies fever, headache, chills, visual problems, N/V/D, SOB, cough, chest pain or abd pain. Pt complains of a very small mass in the upper outer quadrant of her right breast that has been present since 2/2025.  She said it is the size of a pencil eraser.  Pt will need need imaging.  No family hx of breast cancer. Endorses drinking a lot of energy drinks.  Reviewed labs from 3/26/24 HIV VL ND, Vit D 24.1.  7/24/24 CD4 1281. Labs were ordered at last visit, but were never done. She will need labs today and then I will send refills on her medications.  Last cervical pap smear was neg 4/30/24. Pt is agreeable to collecting today.  She is due for an eye exam. I will send a referral.           Past Medical History:   Diagnosis Date     ADHD     Anemia, unspecified     Herpesviral vesicular dermatitis     Human immunodeficiency virus (HIV) disease     Iron deficiency anemia, unspecified     Migraine     Other seasonal allergic rhinitis     Recurrent herpes simplex     Slow learner     Vitamin D deficiency         Past Surgical History:   Procedure Laterality Date    HERNIA REPAIR          Social History     Socioeconomic History    Marital status: Single   Occupational History    Occupation:    Tobacco Use    Smoking status: Never     Passive exposure: Never    Smokeless tobacco: Never   Substance and Sexual Activity    Alcohol use: Yes     Alcohol/week: 5.0 standard drinks of alcohol     Types: 3 Glasses of wine, 2 Shots of liquor per week     Comment: every other weekend wine + liquor    Drug use: Yes     Types: Marijuana     Comment: last use in January 2023    Sexual activity: Not Currently     Partners: Female     Birth control/protection: Other-see comments     Comment: Mirena     Social Drivers of Health     Financial Resource Strain: Low Risk  (5/8/2024)    Overall Financial Resource Strain (CARDIA)     Difficulty of Paying Living Expenses: Not hard at all   Food Insecurity: No Food Insecurity (5/8/2024)    Hunger Vital Sign     Worried About Running Out of Food in the Last Year: Never true     Ran Out of Food in the Last Year: Never true   Transportation Needs: No Transportation Needs (5/8/2024)    TRANSPORTATION NEEDS     Transportation : No   Physical Activity: Sufficiently Active (5/8/2024)    Exercise Vital Sign     Days of Exercise per Week: 4 days     Minutes of Exercise per Session: 40 min   Stress: Stress Concern Present (5/8/2024)    [employer] - Occupational Stress Questionnaire     Feeling of Stress : To some extent   Housing Stability: Unknown (5/8/2024)    Housing Stability Vital Sign     Unable to Pay for Housing in the Last Year: No     Homeless in the Last Year: No        Family  History   Adopted: Yes   Problem Relation Name Age of Onset    HIV Mother      HIV Father          Review of patient's allergies indicates:  No Known Allergies     Immunization History   Administered Date(s) Administered    COVID-19, MRNA, LN-S, PF (Pfizer) (Gray Cap) 03/16/2022    COVID-19, MRNA, LN-S, PF (Pfizer) (Purple Cap) 04/08/2021, 04/29/2021, 03/16/2022    DTaP 2002, 2002, 2002, 09/18/2003, 02/19/2009    HIB 2002, 2002, 2002, 09/18/2003    HPV 9-Valent 05/01/2013, 02/17/2014, 05/13/2014    HPV Quadrivalent 05/01/2013, 02/17/2014, 05/13/2014    Hepatitis A, Pediatric/Adolescent, 2 Dose 02/19/2009, 05/27/2010    Hepatitis B, Adult 09/29/2016    Hepatitis B, Pediatric/Adolescent 2002, 2002, 09/18/2003    IPV 2002, 2002, 2002, 09/18/2003, 08/10/2006    Influenza - Intranasal 09/08/2010    Influenza - Intranasal - Trivalent 09/08/2010    Influenza - Quadrivalent 09/30/2021    Influenza - Quadrivalent - PF *Preferred* (6 months and older) 10/16/2003, 10/27/2004, 11/08/2005, 11/06/2007, 12/02/2008, 09/17/2009, 10/20/2011, 10/31/2012, 12/18/2014, 11/15/2016, 11/15/2016, 09/20/2017, 03/12/2019, 02/18/2020, 11/04/2020, 09/30/2021, 11/21/2022, 09/26/2023    Influenza - Trivalent - Fluarix, Flulaval, Fluzone, Afluria - PF 10/16/2003, 10/27/2004, 11/08/2005, 11/06/2007, 12/02/2008, 09/17/2009, 10/20/2011, 10/31/2012    Influenza A (H1N1) 2009 Monovalent - IM 12/10/2009    MMR 02/07/2003, 02/19/2009    Meningococcal B, OMV 03/12/2019, 09/03/2019    Meningococcal Conjugate (MCV4P) 05/01/2013, 09/10/2018    Pneumococcal Conjugate - 13 Valent 05/27/2010, 09/03/2019    Pneumococcal Conjugate - 20 Valent 07/07/2025    Pneumococcal Conjugate - 7 Valent 2002, 2002, 03/07/2003    Pneumococcal Polysaccharide - 23 Valent 11/04/2004, 02/18/2020    Tdap 05/01/2013, 09/26/2023    Varicella 03/07/2003, 02/19/2009        Review of Systems   Constitutional:  Negative.    HENT: Negative.     Eyes: Negative.    Respiratory: Negative.     Cardiovascular: Negative.    Gastrointestinal: Negative.    Genitourinary: Negative.    Musculoskeletal: Negative.    Skin: Negative.    Neurological: Negative.    Endo/Heme/Allergies: Negative.    Psychiatric/Behavioral: Negative.     All other systems reviewed and are negative.         Objective:      /69 (BP Location: Right arm, Patient Position: Sitting)   Pulse 89   Temp 98.5 °F (36.9 °C) (Oral)   Resp 12   Ht 5' (1.524 m)   Wt 44.2 kg (97 lb 5.3 oz)   LMP  (LMP Unknown)   BMI 19.01 kg/m²      Physical Exam  Vitals reviewed.   Constitutional:       General: She is not in acute distress.     Appearance: Normal appearance.   HENT:      Head: Normocephalic.      Right Ear: External ear normal.      Left Ear: External ear normal.      Nose: Nose normal.      Mouth/Throat:      Mouth: Mucous membranes are moist.      Pharynx: Oropharynx is clear.   Eyes:      General: No scleral icterus.     Extraocular Movements: Extraocular movements intact.      Conjunctiva/sclera: Conjunctivae normal.      Pupils: Pupils are equal, round, and reactive to light.   Cardiovascular:      Rate and Rhythm: Normal rate and regular rhythm.      Pulses: Normal pulses.      Heart sounds: Normal heart sounds.   Pulmonary:      Effort: Pulmonary effort is normal. No respiratory distress.      Breath sounds: Normal breath sounds.   Abdominal:      General: Bowel sounds are normal. There is no distension.      Palpations: Abdomen is soft. There is no mass.      Tenderness: There is no abdominal tenderness. There is no right CVA tenderness or left CVA tenderness.      Hernia: No hernia is present.   Musculoskeletal:         General: No tenderness or signs of injury. Normal range of motion.      Cervical back: Normal range of motion and neck supple.      Right lower leg: No edema.      Left lower leg: No edema.   Lymphadenopathy:      Cervical: No  cervical adenopathy.   Skin:     General: Skin is warm and dry.      Capillary Refill: Capillary refill takes less than 2 seconds.      Findings: No erythema or lesion.   Neurological:      General: No focal deficit present.      Mental Status: She is alert and oriented to person, place, and time. Mental status is at baseline.   Psychiatric:         Mood and Affect: Mood normal.         Behavior: Behavior normal.         Thought Content: Thought content normal.         Judgment: Judgment normal.          Labs:   Lab Results   Component Value Date    WBC 4.29 (L) 06/04/2025    HGB 11.5 (L) 06/04/2025    HCT 35.7 (L) 06/04/2025    MCV 93.0 06/04/2025     06/04/2025       CMP  Sodium   Date Value Ref Range Status   06/04/2025 138 136 - 145 mmol/L Final     Potassium   Date Value Ref Range Status   06/04/2025 3.2 (L) 3.5 - 5.1 mmol/L Final     Chloride   Date Value Ref Range Status   06/04/2025 104 98 - 107 mmol/L Final     CO2   Date Value Ref Range Status   06/04/2025 25 22 - 29 mmol/L Final     Glucose   Date Value Ref Range Status   06/04/2025 83 74 - 100 mg/dL Final     Blood Urea Nitrogen   Date Value Ref Range Status   06/04/2025 9.6 7.0 - 18.7 mg/dL Final     Creatinine   Date Value Ref Range Status   06/04/2025 0.77 0.55 - 1.02 mg/dL Final     Calcium   Date Value Ref Range Status   06/04/2025 9.5 8.4 - 10.2 mg/dL Final     Protein Total   Date Value Ref Range Status   06/04/2025 8.5 (H) 6.4 - 8.3 gm/dL Final     Albumin   Date Value Ref Range Status   06/04/2025 4.5 3.5 - 5.0 g/dL Final     Bilirubin Total   Date Value Ref Range Status   06/04/2025 0.8 <=1.5 mg/dL Final     ALP   Date Value Ref Range Status   06/04/2025 39 (L) 40 - 150 unit/L Final     AST   Date Value Ref Range Status   06/04/2025 23 11 - 45 unit/L Final     ALT   Date Value Ref Range Status   06/04/2025 10 0 - 55 unit/L Final     eGFR   Date Value Ref Range Status   06/04/2025 >60 mL/min/1.73/m2 Final     Comment:     Estimated GFR  calculated using the CKD-EPI creatinine (2021) equation.     Lab Results   Component Value Date    TSH 1.080 06/04/2025     Hep C Ab Interp   Date Value Ref Range Status   06/04/2025 Nonreactive Nonreactive Final     Syphilis Antibody   Date Value Ref Range Status   06/04/2025 Nonreactive Nonreactive, Equivocal Final     Cholesterol Total   Date Value Ref Range Status   06/04/2025 130 <=200 mg/dL Final     HDL Cholesterol   Date Value Ref Range Status   06/04/2025 43 35 - 60 mg/dL Final     Triglyceride   Date Value Ref Range Status   06/04/2025 40 37 - 140 mg/dL Final     Cholesterol/HDL Ratio   Date Value Ref Range Status   06/04/2025 3 0 - 5 Final     Very Low Density Lipoprotein   Date Value Ref Range Status   06/04/2025 8  Final     LDL Cholesterol   Date Value Ref Range Status   06/04/2025 79.00 50.00 - 140.00 mg/dL Final     Comment:     LDL calculated using the Friedewald equation.     Vitamin D   Date Value Ref Range Status   06/04/2025 21 (L) 30 - 80 ng/mL Final     Results for orders placed or performed in visit on 08/11/23   CD4 Lymphocytes   Result Value Ref Range    Patient Age >18     WBC Absolute 5,950 4,500 - 11,500 /mm3    Lymph Percent 25 (L) 28 - 48 %    Lymph Absolute 1,487.5 1,260 - 5,520 x10(3)/mcL    CD4 % 44.42 %    CD4 Absolute 660.7475 589 - 1,505 unit/L    T Cell Interp       Normal absolute lymphocyte count with normal absolute CD4+ lymphocyte count.    Ion Pelayo M.D.       Narrative    This test was developed and its performance characteristics determined by [facility]. It has not been cleared or approved by the US Food and Drug Administration. The FDA does not require this test to go through premarket FDA review. This test is used for clinical purposes. It should not be regarded as investigational or for research. This laboratory is certified under the Clinical Laboratory Improvement Amendments (CLIA) as qualified to perform high complexity  clinical laboratory testing.     Results for orders placed or performed in visit on 06/04/25   HIV-1 RNA, Quantitative, PCR with Reflex to Genotype   Result Value Ref Range    HIV-1 RNA Detect/Quant, P 94564 (A) Undetected copies/mL     Results for orders placed or performed in visit on 06/04/25   Quantiferon Gold TB   Result Value Ref Range    QuantiFERON-Tb Gold Plus Result Negative Negative    TB1 Ag minus Nil Result 0.00 IU/mL    TB2 Ag minus Nil Result 0.00 IU/mL    Mitogen minus Nil Result 9.20 IU/mL    Nil Result 0.01 IU/mL   Quantiferon Gold TB    Narrative    The following orders were created for panel order Quantiferon Gold TB.  Procedure                               Abnormality         Status                     ---------                               -----------         ------                     Quantiferon Gold TB[[id number]]                             Final result                 Please view results for these tests on the individual orders.     No results found for this or any previous visit.  Results for orders placed or performed in visit on 06/04/25   Urinalysis   Result Value Ref Range    Color, UA Yellow Yellow, Light-Yellow, Dark Yellow, Marcelle, Straw    Appearance, UA Clear Clear    Specific Gravity, UA 1.033 (H) 1.005 - 1.030    pH, UA 8.0 5.0 - 8.5    Protein, UA 1+ (A) Negative    Glucose, UA Normal Negative, Normal    Ketones, UA 1+ (A) Negative    Blood, UA Negative Negative    Bilirubin, UA Negative Negative    Urobilinogen, UA 1+ (A) 0.2, 1.0, Normal    Nitrites, UA Negative Negative    Leukocyte Esterase, UA Negative Negative    RBC, UA 0-5 None Seen, 0-2, 3-5, 0-5 /HPF    WBC, UA 0-5 None Seen, 0-2, 3-5, 0-5 /HPF    Bacteria, UA None Seen None Seen /HPF    Squamous Epithelial Cells, UA Few (A) None Seen /HPF    Mucous, UA Occasional (A) None Seen /LPF    Hyaline Casts, UA None Seen None Seen /lpf       Imaging: Reviewed most recent relevant imaging studies available, notable results  highlighted in this note    Medications:     Current Outpatient Medications   Medication Instructions    arbsixpfx-vrvstura-evtqoov ala (BIKTARVY) -25 mg (25 kg or greater) 1 tablet, Oral, Daily    cetirizine (ZYRTEC) 10 mg, Daily PRN    cholecalciferol (vitamin D3) (VITAMIN D3) 1,000 Units, Oral, Daily    sumatriptan (IMITREX) 25 mg, As needed (PRN)       Assessment:       1. HIV disease  -     scdxshayp-hebciwbx-kggvthm ala (BIKTARVY) -25 mg (25 kg or greater); Take 1 tablet by mouth once daily.  Dispense: 30 tablet; Refill: 4  -     HIV-1 RNA, Quantitative, PCR with Reflex to Genotype; Future; Expected date: 07/07/2025  -     CD4 Lymphocytes; Future; Expected date: 07/07/2025  -     CBC Auto Differential; Future; Expected date: 07/07/2025  -     Comprehensive Metabolic Panel; Future; Expected date: 07/07/2025  -     HIV-1 RNA, Quantitative, PCR with Reflex to Genotype; Future; Expected date: 10/07/2025  -     CBC Auto Differential; Future; Expected date: 10/07/2025  -     Comprehensive Metabolic Panel; Future; Expected date: 10/07/2025    2. G6PD deficiency    3. Vitamin D deficiency  -     cholecalciferol, vitamin D3, (VITAMIN D3) 25 mcg (1,000 unit) capsule; Take 1 capsule (1,000 Units total) by mouth once daily.  Dispense: 30 capsule; Refill: 4    4. Immunization due  -     pneumoc 20-nimco conj-dip cr(PF) (PREVNAR-20 (PF)) injection Syrg 0.5 mL  -     mening vac A,C,Y,W135 dip (PF) (MENVEO) 10-5 mcg/0.5 mL vaccine (PREFERRED)(10 - 56 YO) 0.5 mL           Plan:      HIV disease  -     qiizmhshx-lnpuskjq-zuxtkww ala (BIKTARVY) -25 mg (25 kg or greater); Take 1 tablet by mouth once daily.  Dispense: 30 tablet; Refill: 4  -     HIV-1 RNA, Quantitative, PCR with Reflex to Genotype; Future; Expected date: 07/07/2025  -     CD4 Lymphocytes; Future; Expected date: 07/07/2025  -     CBC Auto Differential; Future; Expected date: 07/07/2025  -     Comprehensive Metabolic Panel; Future; Expected date:  07/07/2025  -     HIV-1 RNA, Quantitative, PCR with Reflex to Genotype; Future; Expected date: 10/07/2025  -     CBC Auto Differential; Future; Expected date: 10/07/2025  -     Comprehensive Metabolic Panel; Future; Expected date: 10/07/2025  Dx @ birth   Initial HIV VL unknown and CD4 joy unknown. Documentation is reflective of AIDS dx of birth  OI hx: No hx      Adherence and sexual health counseling done.  Use condoms for all sexual encounters.  Blood precautions.  Continue Biktarvy as prescribed.   Labs today and 1 week before next visit   RTC 3 months with Kayley for a telemed visit      Wellness:  Cervical pap:6/4/25, colpo scheduled for 9/5/24  Cervical CT/GC:6/4/25  Eye exam: scheduled for 10/28/25  Vaccines recommended    G6PD deficiency  Avoid andrez beans, ASA, and Sulfonamides (Dapsone, Macrobid, Primaquine, etc).     Vitamin D deficiency  -     cholecalciferol, vitamin D3, (VITAMIN D3) 25 mcg (1,000 unit) capsule; Take 1 capsule (1,000 Units total) by mouth once daily.  Dispense: 30 capsule; Refill: 4  Continue meds. Pt educated on the importance of Vit D to bone and organ health.     Immunization due  -     pneumoc 20-nimco conj-dip cr(PF) (PREVNAR-20 (PF)) injection Syrg 0.5 mL  -     mening vac A,C,Y,W135 dip (PF) (MENVEO) 10-5 mcg/0.5 mL vaccine (PREFERRED)(10 - 56 YO) 0.5 mL

## 2025-07-08 LAB
AGE: 23
CD3+CD4+ CELLS # SPEC: 729 UNIT/L (ref 589–1505)
CD3+CD4+ CELLS NFR BLD: 41.2 %
LYMPHOCYTES # BLD AUTO: 1769.79 X10(3)/MCL (ref 1260–5520)
LYMPHOCYTES NFR LN MANUAL: 34.1 % (ref 28–48)
LYMPHOMA - T-CELL MARKERS SPEC-IMP: NORMAL
WBC # BLD AUTO: 5190 /MM3 (ref 4500–11500)

## 2025-07-09 LAB — HIV1 RNA # PLAS NAA DL=20: <20 COPIES/ML
